# Patient Record
Sex: FEMALE | Race: OTHER | Employment: UNEMPLOYED | URBAN - METROPOLITAN AREA
[De-identification: names, ages, dates, MRNs, and addresses within clinical notes are randomized per-mention and may not be internally consistent; named-entity substitution may affect disease eponyms.]

---

## 2022-07-26 ENCOUNTER — APPOINTMENT (OUTPATIENT)
Dept: CT IMAGING | Age: 52
DRG: 392 | End: 2022-07-26
Attending: STUDENT IN AN ORGANIZED HEALTH CARE EDUCATION/TRAINING PROGRAM

## 2022-07-26 ENCOUNTER — APPOINTMENT (OUTPATIENT)
Dept: ULTRASOUND IMAGING | Age: 52
DRG: 392 | End: 2022-07-26
Attending: SURGERY

## 2022-07-26 ENCOUNTER — HOSPITAL ENCOUNTER (INPATIENT)
Age: 52
LOS: 4 days | Discharge: HOME | DRG: 392 | End: 2022-07-30
Attending: STUDENT IN AN ORGANIZED HEALTH CARE EDUCATION/TRAINING PROGRAM | Admitting: INTERNAL MEDICINE

## 2022-07-26 DIAGNOSIS — I21.4 NSTEMI (NON-ST ELEVATED MYOCARDIAL INFARCTION) (HCC): ICD-10-CM

## 2022-07-26 DIAGNOSIS — K52.9 ACUTE COLITIS: ICD-10-CM

## 2022-07-26 DIAGNOSIS — K81.0 ACUTE CHOLECYSTITIS: Primary | ICD-10-CM

## 2022-07-26 PROBLEM — E83.52 HYPERCALCEMIA: Status: ACTIVE | Noted: 2022-07-26

## 2022-07-26 PROBLEM — R82.81 PYURIA: Status: ACTIVE | Noted: 2022-07-26

## 2022-07-26 PROBLEM — K81.9 CHOLECYSTITIS: Status: ACTIVE | Noted: 2022-07-26

## 2022-07-26 PROBLEM — K80.20 CHOLELITHIASIS: Status: ACTIVE | Noted: 2022-07-26

## 2022-07-26 PROBLEM — E80.6 HYPERBILIRUBINEMIA: Status: ACTIVE | Noted: 2022-07-26

## 2022-07-26 PROBLEM — R10.9 ABDOMINAL PAIN: Status: ACTIVE | Noted: 2022-07-26

## 2022-07-26 PROBLEM — E88.89 STEATOSIS (HCC): Status: ACTIVE | Noted: 2022-07-26

## 2022-07-26 PROBLEM — R77.8 ELEVATED TROPONIN: Status: ACTIVE | Noted: 2022-07-26

## 2022-07-26 PROBLEM — R11.2 NAUSEA WITH VOMITING: Status: ACTIVE | Noted: 2022-07-26

## 2022-07-26 PROBLEM — R79.89 LFT ELEVATION: Status: ACTIVE | Noted: 2022-07-26

## 2022-07-26 LAB
ALBUMIN SERPL-MCNC: 3.9 G/DL (ref 3.5–5)
ALBUMIN/GLOB SERPL: 1 {RATIO} (ref 1.1–2.2)
ALP SERPL-CCNC: 113 U/L (ref 45–117)
ALT SERPL-CCNC: 104 U/L (ref 12–78)
ANION GAP SERPL CALC-SCNC: 5 MMOL/L (ref 5–15)
APPEARANCE UR: ABNORMAL
AST SERPL-CCNC: 49 U/L (ref 15–37)
ATRIAL RATE: 63 BPM
BACTERIA URNS QL MICRO: ABNORMAL /HPF
BASOPHILS # BLD: 0 K/UL (ref 0–0.1)
BASOPHILS NFR BLD: 0 % (ref 0–1)
BILIRUB DIRECT SERPL-MCNC: 0.4 MG/DL (ref 0–0.2)
BILIRUB SERPL-MCNC: 2.5 MG/DL (ref 0.2–1)
BILIRUB UR QL CFM: NEGATIVE
BUN SERPL-MCNC: 9 MG/DL (ref 6–20)
BUN/CREAT SERPL: 9 (ref 12–20)
CALCIUM SERPL-MCNC: 11 MG/DL (ref 8.5–10.1)
CALCIUM SERPL-MCNC: 9.9 MG/DL (ref 8.5–10.1)
CALCULATED P AXIS, ECG09: 24 DEGREES
CALCULATED R AXIS, ECG10: 63 DEGREES
CALCULATED T AXIS, ECG11: -7 DEGREES
CHLORIDE SERPL-SCNC: 111 MMOL/L (ref 97–108)
CHOLEST SERPL-MCNC: 116 MG/DL
CO2 SERPL-SCNC: 27 MMOL/L (ref 21–32)
COLOR UR: ABNORMAL
COMMENT, HOLDF: NORMAL
COVID-19 RAPID TEST, COVR: NOT DETECTED
CREAT SERPL-MCNC: 0.95 MG/DL (ref 0.55–1.02)
DIAGNOSIS, 93000: NORMAL
DIFFERENTIAL METHOD BLD: ABNORMAL
EOSINOPHIL # BLD: 0 K/UL (ref 0–0.4)
EOSINOPHIL NFR BLD: 0 % (ref 0–7)
EPITH CASTS URNS QL MICRO: ABNORMAL /LPF
ERYTHROCYTE [DISTWIDTH] IN BLOOD BY AUTOMATED COUNT: 13.5 % (ref 11.5–14.5)
ETHANOL SERPL-MCNC: <10 MG/DL
GLOBULIN SER CALC-MCNC: 3.8 G/DL (ref 2–4)
GLUCOSE SERPL-MCNC: 137 MG/DL (ref 65–100)
GLUCOSE UR STRIP.AUTO-MCNC: NEGATIVE MG/DL
HCT VFR BLD AUTO: 46.2 % (ref 35–47)
HDLC SERPL-MCNC: 33 MG/DL
HDLC SERPL: 3.5 {RATIO} (ref 0–5)
HGB BLD-MCNC: 15.8 G/DL (ref 11.5–16)
HGB UR QL STRIP: ABNORMAL
IMM GRANULOCYTES # BLD AUTO: 0 K/UL (ref 0–0.04)
IMM GRANULOCYTES NFR BLD AUTO: 0 % (ref 0–0.5)
KETONES UR QL STRIP.AUTO: 80 MG/DL
LDLC SERPL CALC-MCNC: 56.6 MG/DL (ref 0–100)
LEUKOCYTE ESTERASE UR QL STRIP.AUTO: NEGATIVE
LIPASE SERPL-CCNC: 38 U/L (ref 73–393)
LYMPHOCYTES # BLD: 1.4 K/UL (ref 0.8–3.5)
LYMPHOCYTES NFR BLD: 15 % (ref 12–49)
MCH RBC QN AUTO: 30.4 PG (ref 26–34)
MCHC RBC AUTO-ENTMCNC: 34.2 G/DL (ref 30–36.5)
MCV RBC AUTO: 88.8 FL (ref 80–99)
MONOCYTES # BLD: 0.7 K/UL (ref 0–1)
MONOCYTES NFR BLD: 8 % (ref 5–13)
NEUTS SEG # BLD: 7 K/UL (ref 1.8–8)
NEUTS SEG NFR BLD: 77 % (ref 32–75)
NITRITE UR QL STRIP.AUTO: NEGATIVE
NRBC # BLD: 0 K/UL (ref 0–0.01)
NRBC BLD-RTO: 0 PER 100 WBC
P-R INTERVAL, ECG05: 186 MS
PH UR STRIP: 5.5 [PH] (ref 5–8)
PHOSPHATE SERPL-MCNC: 1.9 MG/DL (ref 2.6–4.7)
PLATELET # BLD AUTO: 129 K/UL (ref 150–400)
PMV BLD AUTO: 10.2 FL (ref 8.9–12.9)
POTASSIUM SERPL-SCNC: 4.2 MMOL/L (ref 3.5–5.1)
PROCALCITONIN SERPL-MCNC: <0.05 NG/ML
PROT SERPL-MCNC: 7.7 G/DL (ref 6.4–8.2)
PROT UR STRIP-MCNC: 300 MG/DL
PTH-INTACT SERPL-MCNC: 115.7 PG/ML (ref 18.4–88)
Q-T INTERVAL, ECG07: 386 MS
QRS DURATION, ECG06: 88 MS
QTC CALCULATION (BEZET), ECG08: 395 MS
RBC # BLD AUTO: 5.2 M/UL (ref 3.8–5.2)
RBC #/AREA URNS HPF: ABNORMAL /HPF (ref 0–5)
SAMPLES BEING HELD,HOLD: NORMAL
SODIUM SERPL-SCNC: 143 MMOL/L (ref 136–145)
SOURCE, COVRS: NORMAL
SP GR UR REFRACTOMETRY: 1.01 (ref 1–1.03)
TRIGL SERPL-MCNC: 132 MG/DL (ref ?–150)
TROPONIN-HIGH SENSITIVITY: 113 NG/L (ref 0–51)
TROPONIN-HIGH SENSITIVITY: 149 NG/L (ref 0–51)
TSH SERPL DL<=0.05 MIU/L-ACNC: 1.88 UIU/ML (ref 0.36–3.74)
UR CULT HOLD, URHOLD: NORMAL
UROBILINOGEN UR QL STRIP.AUTO: 1 EU/DL (ref 0.2–1)
VENTRICULAR RATE, ECG03: 63 BPM
VLDLC SERPL CALC-MCNC: 26.4 MG/DL
WBC # BLD AUTO: 9.1 K/UL (ref 3.6–11)
WBC URNS QL MICRO: ABNORMAL /HPF (ref 0–4)

## 2022-07-26 PROCEDURE — 81001 URINALYSIS AUTO W/SCOPE: CPT

## 2022-07-26 PROCEDURE — 80074 ACUTE HEPATITIS PANEL: CPT

## 2022-07-26 PROCEDURE — 74011000636 HC RX REV CODE- 636: Performed by: STUDENT IN AN ORGANIZED HEALTH CARE EDUCATION/TRAINING PROGRAM

## 2022-07-26 PROCEDURE — 74011000258 HC RX REV CODE- 258: Performed by: INTERNAL MEDICINE

## 2022-07-26 PROCEDURE — 82077 ASSAY SPEC XCP UR&BREATH IA: CPT

## 2022-07-26 PROCEDURE — 84484 ASSAY OF TROPONIN QUANT: CPT

## 2022-07-26 PROCEDURE — 84100 ASSAY OF PHOSPHORUS: CPT

## 2022-07-26 PROCEDURE — 74177 CT ABD & PELVIS W/CONTRAST: CPT

## 2022-07-26 PROCEDURE — 87635 SARS-COV-2 COVID-19 AMP PRB: CPT

## 2022-07-26 PROCEDURE — 83690 ASSAY OF LIPASE: CPT

## 2022-07-26 PROCEDURE — 80061 LIPID PANEL: CPT

## 2022-07-26 PROCEDURE — 84145 PROCALCITONIN (PCT): CPT

## 2022-07-26 PROCEDURE — 74011250637 HC RX REV CODE- 250/637: Performed by: STUDENT IN AN ORGANIZED HEALTH CARE EDUCATION/TRAINING PROGRAM

## 2022-07-26 PROCEDURE — 99253 IP/OBS CNSLTJ NEW/EST LOW 45: CPT | Performed by: SURGERY

## 2022-07-26 PROCEDURE — 74011000250 HC RX REV CODE- 250: Performed by: STUDENT IN AN ORGANIZED HEALTH CARE EDUCATION/TRAINING PROGRAM

## 2022-07-26 PROCEDURE — 74011250636 HC RX REV CODE- 250/636: Performed by: INTERNAL MEDICINE

## 2022-07-26 PROCEDURE — 74011250636 HC RX REV CODE- 250/636: Performed by: SURGERY

## 2022-07-26 PROCEDURE — 99285 EMERGENCY DEPT VISIT HI MDM: CPT

## 2022-07-26 PROCEDURE — 96365 THER/PROPH/DIAG IV INF INIT: CPT

## 2022-07-26 PROCEDURE — 74011250636 HC RX REV CODE- 250/636: Performed by: STUDENT IN AN ORGANIZED HEALTH CARE EDUCATION/TRAINING PROGRAM

## 2022-07-26 PROCEDURE — 36415 COLL VENOUS BLD VENIPUNCTURE: CPT

## 2022-07-26 PROCEDURE — 74011250637 HC RX REV CODE- 250/637: Performed by: INTERNAL MEDICINE

## 2022-07-26 PROCEDURE — 80053 COMPREHEN METABOLIC PANEL: CPT

## 2022-07-26 PROCEDURE — 82248 BILIRUBIN DIRECT: CPT

## 2022-07-26 PROCEDURE — 83970 ASSAY OF PARATHORMONE: CPT

## 2022-07-26 PROCEDURE — 96375 TX/PRO/DX INJ NEW DRUG ADDON: CPT

## 2022-07-26 PROCEDURE — 87086 URINE CULTURE/COLONY COUNT: CPT

## 2022-07-26 PROCEDURE — 83036 HEMOGLOBIN GLYCOSYLATED A1C: CPT

## 2022-07-26 PROCEDURE — 85025 COMPLETE CBC W/AUTO DIFF WBC: CPT

## 2022-07-26 PROCEDURE — 84443 ASSAY THYROID STIM HORMONE: CPT

## 2022-07-26 PROCEDURE — 93005 ELECTROCARDIOGRAM TRACING: CPT

## 2022-07-26 PROCEDURE — 76705 ECHO EXAM OF ABDOMEN: CPT

## 2022-07-26 PROCEDURE — 65270000029 HC RM PRIVATE

## 2022-07-26 RX ORDER — HYDROMORPHONE HYDROCHLORIDE 1 MG/ML
0.5 INJECTION, SOLUTION INTRAMUSCULAR; INTRAVENOUS; SUBCUTANEOUS
Status: COMPLETED | OUTPATIENT
Start: 2022-07-26 | End: 2022-07-26

## 2022-07-26 RX ORDER — ACETAMINOPHEN 650 MG/1
650 SUPPOSITORY RECTAL
Status: DISCONTINUED | OUTPATIENT
Start: 2022-07-26 | End: 2022-07-28

## 2022-07-26 RX ORDER — ONDANSETRON 2 MG/ML
4 INJECTION INTRAMUSCULAR; INTRAVENOUS
Status: DISCONTINUED | OUTPATIENT
Start: 2022-07-26 | End: 2022-07-30 | Stop reason: HOSPADM

## 2022-07-26 RX ORDER — ONDANSETRON 4 MG/1
4 TABLET, ORALLY DISINTEGRATING ORAL
Status: DISCONTINUED | OUTPATIENT
Start: 2022-07-26 | End: 2022-07-28

## 2022-07-26 RX ORDER — KETOROLAC TROMETHAMINE 30 MG/ML
30 INJECTION, SOLUTION INTRAMUSCULAR; INTRAVENOUS
Status: COMPLETED | OUTPATIENT
Start: 2022-07-26 | End: 2022-07-26

## 2022-07-26 RX ORDER — DEXTROSE, SODIUM CHLORIDE, AND POTASSIUM CHLORIDE 5; .45; .15 G/100ML; G/100ML; G/100ML
100 INJECTION INTRAVENOUS CONTINUOUS
Status: DISCONTINUED | OUTPATIENT
Start: 2022-07-26 | End: 2022-07-30 | Stop reason: HOSPADM

## 2022-07-26 RX ORDER — ACETAMINOPHEN 325 MG/1
650 TABLET ORAL
Status: DISCONTINUED | OUTPATIENT
Start: 2022-07-26 | End: 2022-07-30 | Stop reason: HOSPADM

## 2022-07-26 RX ORDER — METRONIDAZOLE 500 MG/100ML
500 INJECTION, SOLUTION INTRAVENOUS
Status: DISCONTINUED | OUTPATIENT
Start: 2022-07-26 | End: 2022-07-26

## 2022-07-26 RX ORDER — HYDROMORPHONE HYDROCHLORIDE 1 MG/ML
1 INJECTION, SOLUTION INTRAMUSCULAR; INTRAVENOUS; SUBCUTANEOUS
Status: DISCONTINUED | OUTPATIENT
Start: 2022-07-26 | End: 2022-07-30 | Stop reason: HOSPADM

## 2022-07-26 RX ORDER — POLYETHYLENE GLYCOL 3350 17 G/17G
17 POWDER, FOR SOLUTION ORAL DAILY PRN
Status: DISCONTINUED | OUTPATIENT
Start: 2022-07-26 | End: 2022-07-28

## 2022-07-26 RX ORDER — GUAIFENESIN 100 MG/5ML
162 LIQUID (ML) ORAL
Status: COMPLETED | OUTPATIENT
Start: 2022-07-26 | End: 2022-07-26

## 2022-07-26 RX ORDER — FAMOTIDINE 20 MG/1
20 TABLET, FILM COATED ORAL
Status: COMPLETED | OUTPATIENT
Start: 2022-07-26 | End: 2022-07-26

## 2022-07-26 RX ORDER — ENOXAPARIN SODIUM 100 MG/ML
40 INJECTION SUBCUTANEOUS DAILY
Status: DISCONTINUED | OUTPATIENT
Start: 2022-07-27 | End: 2022-07-27

## 2022-07-26 RX ORDER — SODIUM CHLORIDE 9 MG/ML
100 INJECTION, SOLUTION INTRAVENOUS CONTINUOUS
Status: DISCONTINUED | OUTPATIENT
Start: 2022-07-26 | End: 2022-07-26

## 2022-07-26 RX ORDER — ONDANSETRON 2 MG/ML
4 INJECTION INTRAMUSCULAR; INTRAVENOUS
Status: COMPLETED | OUTPATIENT
Start: 2022-07-26 | End: 2022-07-26

## 2022-07-26 RX ADMIN — PIPERACILLIN AND TAZOBACTAM 4.5 G: 4; .5 INJECTION, POWDER, FOR SOLUTION INTRAVENOUS at 12:10

## 2022-07-26 RX ADMIN — SODIUM CHLORIDE 1000 ML: 9 INJECTION, SOLUTION INTRAVENOUS at 07:42

## 2022-07-26 RX ADMIN — FAMOTIDINE 20 MG: 20 TABLET, FILM COATED ORAL at 07:41

## 2022-07-26 RX ADMIN — POTASSIUM CHLORIDE, DEXTROSE MONOHYDRATE AND SODIUM CHLORIDE 100 ML/HR: 150; 5; 450 INJECTION, SOLUTION INTRAVENOUS at 13:44

## 2022-07-26 RX ADMIN — HYDROMORPHONE HYDROCHLORIDE 1 MG: 1 INJECTION, SOLUTION INTRAMUSCULAR; INTRAVENOUS; SUBCUTANEOUS at 16:16

## 2022-07-26 RX ADMIN — METRONIDAZOLE 500 MG: 500 INJECTION, SOLUTION INTRAVENOUS at 11:32

## 2022-07-26 RX ADMIN — HYDROMORPHONE HYDROCHLORIDE 1 MG: 1 INJECTION, SOLUTION INTRAMUSCULAR; INTRAVENOUS; SUBCUTANEOUS at 22:13

## 2022-07-26 RX ADMIN — ONDANSETRON 4 MG: 2 INJECTION INTRAMUSCULAR; INTRAVENOUS at 07:41

## 2022-07-26 RX ADMIN — PIPERACILLIN AND TAZOBACTAM 3.38 G: 3; .375 INJECTION, POWDER, FOR SOLUTION INTRAVENOUS at 18:36

## 2022-07-26 RX ADMIN — ONDANSETRON 4 MG: 4 TABLET, ORALLY DISINTEGRATING ORAL at 12:13

## 2022-07-26 RX ADMIN — ASPIRIN 162 MG: 81 TABLET, CHEWABLE ORAL at 11:50

## 2022-07-26 RX ADMIN — HYDROMORPHONE HYDROCHLORIDE 0.5 MG: 1 INJECTION, SOLUTION INTRAMUSCULAR; INTRAVENOUS; SUBCUTANEOUS at 11:40

## 2022-07-26 RX ADMIN — SODIUM CHLORIDE, PRESERVATIVE FREE 2 G: 5 INJECTION INTRAVENOUS at 11:34

## 2022-07-26 RX ADMIN — IOPAMIDOL 100 ML: 755 INJECTION, SOLUTION INTRAVENOUS at 09:33

## 2022-07-26 RX ADMIN — KETOROLAC TROMETHAMINE 30 MG: 30 INJECTION, SOLUTION INTRAMUSCULAR; INTRAVENOUS at 07:41

## 2022-07-26 NOTE — ED TRIAGE NOTES
Pt arrives to ED with complaints of epigastric pain, and upper back pain with headache and vomiting since last night. Pt was unable to sleep last night. Pt reports she thinks she is constipated, as this is a continuing problem for her. Pt denies health hx.

## 2022-07-26 NOTE — CONSULTS
Surgery Consult    Subjective:      Sebastien Marquez is a 47 y.o.  female who presents with generalized abdominal pain. For the past week, Mrs. Donald Kimbrough has experienced constipation. About 2 days ago, she developed generalized abdominal pain. The pain has progressively worsened and was associated with subjective fever and n/v. She denies any jaundice, diarrhea, or blood in her stool. She has no h/o PUD, pancreatitis, liver disease, or colitis. Her previous abdominal procedures include a  and lap appendectomy. On presentation to the ER, her CT revealed findings c/w colitis in the cecum and ascending colon with mild GB wall thickening and distention, but no stones are documented. Her troponin was elevated as well. No past medical history on file. Past Surgical History:   Procedure Laterality Date    HX APPENDECTOMY        No family history on file.   Social History     Socioeconomic History    Marital status:       Current Facility-Administered Medications   Medication Dose Route Frequency Provider Last Rate Last Admin    acetaminophen (TYLENOL) tablet 650 mg  650 mg Oral Q6H PRN Tom Fair MD        Or    acetaminophen (TYLENOL) suppository 650 mg  650 mg Rectal Q6H PRN Tom Fair MD        polyethylene glycol (MIRALAX) packet 17 g  17 g Oral DAILY PRN Tom Fair MD        ondansetron (ZOFRAN ODT) tablet 4 mg  4 mg Oral Q8H PRN Tom Fair MD   4 mg at 22 1213    Or    ondansetron (ZOFRAN) injection 4 mg  4 mg IntraVENous Q6H PRN Tom Fair MD        [START ON 2022] enoxaparin (LOVENOX) injection 40 mg  40 mg SubCUTAneous DAILY Tom Fair MD        dextrose 5% - 0.45% NaCl with KCl 20 mEq/L infusion  100 mL/hr IntraVENous CONTINUOUS Tom Fair  mL/hr at 22 1344 100 mL/hr at 22 1344    piperacillin-tazobactam (ZOSYN) 4.5 g in 0.9% sodium chloride (MBP/ADV) 100 mL MBP  4.5 g IntraVENous ONCE [facility] MD LIS 25 mL/hr at 22 1210 4.5 g at 22 1210    Followed by    piperacillin-tazobactam (ZOSYN) 3.375 g in 0.9% sodium chloride (MBP/ADV) 100 mL MBP  3.375 g IntraVENous Q8H Sandi Campa MD            No Known Allergies    Review of Systems:  A comprehensive review of systems was negative except for that written in the History of Present Illness. Objective:      Patient Vitals for the past 8 hrs:   BP Temp Pulse Resp SpO2 Height Weight   22 1206 -- -- -- -- -- 5' 6\" (1.676 m) 190 lb (86.2 kg)   22 1121 138/70 98.2 °F (36.8 °C) 65 22 99 % -- --       Temp (24hrs), Av.7 °F (36.5 °C), Min:97.2 °F (36.2 °C), Max:98.2 °F (36.8 °C)      Physical Exam:  GENERAL: alert, cooperative, no distress, appears stated age, EYE: negative, LYMPHATIC: Cervical, supraclavicular nodes normal. , THROAT & NECK: normal, LUNG: clear to auscultation bilaterally, HEART: regular rate and rhythm, ABDOMEN: Soft, hypoactive BS, ND, mild diffuse tenderness without guarding.   There is a healed lower midline surgical scar., EXTREMITIES:  no edema, SKIN: Normal., NEUROLOGIC: negative, PSYCHIATRIC: non focal    Assessment:     Hospital Problems  Date Reviewed: 2022            Codes Class Noted POA    LFT elevation ICD-10-CM: R79.89  ICD-9-CM: 790.6  2022 Yes        Elevated troponin ICD-10-CM: R77.8  ICD-9-CM: 790.6  2022 Yes        Colitis ICD-10-CM: K52.9  ICD-9-CM: 558.9  2022 Yes        Abdominal pain ICD-10-CM: R10.9  ICD-9-CM: 789.00  2022 Yes        Steatosis ([address] ICD-10-CM: E88.89  ICD-9-CM: 272.8  2022 Yes        Hyperbilirubinemia ICD-10-CM: E80.6  ICD-9-CM: 782.4  2022 Yes        Hypercalcemia ICD-10-CM: E83.52  ICD-9-CM: 275.42  2022 Yes        Pyuria ICD-10-CM: R82.81  ICD-9-CM: 791.9  2022 Yes        Cholelithiasis ICD-10-CM: K80.20  ICD-9-CM: 574.20  2022 Yes        Nausea with vomiting ICD-10-CM: R11.2  ICD-9-CM: 787.01  2022 Yes Cholecystitis ICD-10-CM: K81.9  ICD-9-CM: 575.10  7/26/2022 Yes           Plan:     Mrs. Lizette Blackwell pain is diffuse which is most likely secondary to her colitis. The findings regarding her GB may be related to the adjacent inflammation. I will order an US to better evaluate the GB and to document the presence of stones. Ideally, the best time to perform a cholecystectomy would be on an elective basis in the absence of colitis. I would continue bowel rest, aggressive hydration, and add antibiotics as needed. GI has been asked to see her as well. I appreciate the medical service allowing me to participate in Mrs. Gil's care. Any further recommendations will be made as needed.

## 2022-07-26 NOTE — PROGRESS NOTES
CARE MANAGEMENT INITIAL ASSESSEMENT      NAME:   Jerica Boothe   :     1970   MRN:     [id number]       Emergency Contact:  Extended Emergency Contact Information  Primary Emergency Contact: Lidia Diaz  Mobile Phone: [phone]  Relation: Sister  Preferred language: Uzbek   needed? No    Advance Directive:  Full Code, does not have an advance directive. Kit Billy Healthcare Decision Maker:   Mickey Fraire- sister- [phone]    Reason for Admission:  Ms. Neri Kraus is a 47 y.o. female with history that includes no significant medical issues  who was emergently admitted for:  colitis    Patient Active Problem List   Diagnosis Code    LFT elevation R79.89    Elevated troponin R77.8    Colitis K52.9    Abdominal pain R10.9    Steatosis (HCC) E88.89    Hyperbilirubinemia E80.6    Hypercalcemia E83.52    Pyuria R82.81    Cholelithiasis K80.20    Nausea with vomiting R11.2    Cholecystitis K81.9       Assessment: In person with patient and family member (sister) . RUR:  4%  Risk Level:  Low  Value-based purchasing:   No  Bundle patient:  No    Residency:  Private residence  Exterior Steps:  None  Interior Steps:  None    Lives With:  Other family member(s) (brothers and sisters)    Prior functioning:  Independent. Patient requires assistance with:  N/A    Prior DME required:  None    DME available:  None    Rehab history:  None    Discharge Concerns/Barriers Identified:  No PCP and Uninsured or underinsured      Insurer:  Payor: /     PCP: None   Name of Practice:  None   Current patient: N/A   Approximate date of last visit: N/A   Access to virtual PCP visits:  N/A    Pharmacy:  [address] [place] [address]   Financial/Difficulty affording medications:  None identified    COVID-19 vaccination status:  Fully vaccinated + booster    DC Transport:  Family      Transition of care plan:  Home with outpatient follow-up     Comments:   Pt admitted on 22 for colitis.  CM met with Pt and sister to complete initial assessment. Pt lives at home with family (brothers and sisters). Pt has no hx of HH, home O2, or equipment. Pt is independent with ADLs and ambulation. Pt denies problems with either. Pt is not employed. Pt is able to drive. Pt does not have a PCP. CM provided Pt and sister with [address] information. Pt does not have insurance. CM provided Pt and sister with financial assistance application [place] Discharge plan is for Pt to return home. Family will transport Pt home.   _____________________________________  Lesley Ramsey, [address] [facility]  Available via [employer]  7/26/2022   4:18 PM      Care Management Interventions  PCP Verified by CM: Yes (NO PCP)  Mode of Transport at Discharge:  Other (see comment) (family)  Transition of Care Consult (CM Consult): Discharge Planning  MyChart Signup: No  Discharge Durable Medical Equipment: No  Physical Therapy Consult: No  Occupational Therapy Consult: No  Speech Therapy Consult: No  Support Systems: Other Family Member(s)  Confirm Follow Up Transport: Family  Discharge Location  Patient Expects to be Discharged to[de-identified] Home

## 2022-07-26 NOTE — ED PROVIDER NOTES
Chief Complaint   Patient presents with    Abdominal Pain     History and review of systems obtained via [email or web address] . This is a 48-year-old female presenting with epigastric abdominal pain that started yesterday she also feels very constipated and has been taking Dulcolax without any relief. Had an episode of vomiting last night and she continues to feel nauseous, endorses a headache. Last bowel movement was yesterday. No rectal bleeding. She denies any history of abdominal surgery. The pain radiates towards her back and up towards her chest causing her to feel short of breath. Subjective fevers at home, afebrile here. Denies any dysuria or urinary frequency. No other systemic complaints. Symptoms are moderate in nature without alleviating factors. Review of Systems   Constitutional:  Negative for fever. HENT:  Negative for facial swelling. Eyes:  Negative for redness. Respiratory:  Positive for shortness of breath. Cardiovascular:  Positive for chest pain. Gastrointestinal:  Positive for abdominal pain, constipation and vomiting. Genitourinary:  Negative for difficulty urinating, dysuria and frequency. Musculoskeletal:  Positive for back pain. Neurological:  Positive for headaches. Psychiatric/Behavioral:  Negative for confusion. No past medical history on file. CORRECTION:  Obtained by me, includes constipation    No past surgical history on file. CORRECTION:  Obtained by me, no prior abdominal surgery      No family history on file.  CORRECTION:  Obtained by me, social history negative for alcohol use, she is a former smoker (cigarettes)    Social History     Socioeconomic History    Marital status:      Spouse name: Not on file    Number of children: Not on file    Years of education: Not on file    Highest education level: Not on file   Occupational History    Not on file   Tobacco Use    Smoking status: Not on file    Smokeless tobacco: Not on file Substance and Sexual Activity    Alcohol use: Not on file    Drug use: Not on file    Sexual activity: Not on file   Other Topics Concern    Not on file   Social History Narrative    Not on file     Social Determinants of Health     Financial Resource Strain: Not on file   Food Insecurity: Not on file   Transportation Needs: Not on file   Physical Activity: Not on file   Stress: Not on file   Social Connections: Not on file   Intimate Partner Violence: Not on file   Housing Stability: Not on file         ALLERGIES: Patient has no known allergies. Vitals:    07/26/22 0706 07/26/22 1121   BP: (!) 144/74 138/70   Pulse: 70 65   Resp: 18 22   Temp: 97.2 °F (36.2 °C) 98.2 °F (36.8 °C)   SpO2: 98% 99%       Physical exam  General:  Awake and alert, NAD  HEENT:  NC/AT, equal pupils, moist mucous membranes  Neck:   Normal inspection, full range of motion  Cardiac:  RRR, no murmurs  Respiratory:  Clear bilaterally, no wheezes, rales, rhonchi  Abdomen:  Soft and nondistended, +tender in the epigastrium and suprapubic regions, negative Choudhury's sign  Back:   Good ROM  Extremities: Warm and well perfused, no peripheral edema  Neuro:  Moving all extremities symmetrically without gross motor deficit, normal gait  Skin:   No rashes, ecchymoses, or pallor    Recent Results (from the past 12 hour(s))   SAMPLES BEING HELD    Collection Time: 07/26/22  7:36 AM   Result Value Ref Range    SAMPLES BEING HELD RED. ESTRELLA     COMMENT        Add-on orders for these samples will be processed based on acceptable specimen integrity and analyte stability, which may vary by analyte.    CBC WITH AUTOMATED DIFF    Collection Time: 07/26/22  7:36 AM   Result Value Ref Range    WBC 9.1 3.6 - 11.0 K/uL    RBC 5.20 3.80 - 5.20 M/uL    HGB 15.8 11.5 - 16.0 g/dL    HCT 46.2 35.0 - 47.0 %    MCV 88.8 80.0 - 99.0 FL    MCH 30.4 26.0 - 34.0 PG    MCHC 34.2 30.0 - 36.5 g/dL    RDW 13.5 11.5 - 14.5 %    PLATELET 333 (L) 466 - 400 K/uL    MPV 10.2 8.9 - 12.9 FL    NRBC 0.0 0  WBC    ABSOLUTE NRBC 0.00 0.00 - 0.01 K/uL    NEUTROPHILS 77 (H) 32 - 75 %    LYMPHOCYTES 15 12 - 49 %    MONOCYTES 8 5 - 13 %    EOSINOPHILS 0 0 - 7 %    BASOPHILS 0 0 - 1 %    IMMATURE GRANULOCYTES 0 0.0 - 0.5 %    ABS. NEUTROPHILS 7.0 1.8 - 8.0 K/UL    ABS. LYMPHOCYTES 1.4 0.8 - 3.5 K/UL    ABS. MONOCYTES 0.7 0.0 - 1.0 K/UL    ABS. EOSINOPHILS 0.0 0.0 - 0.4 K/UL    ABS. BASOPHILS 0.0 0.0 - 0.1 K/UL    ABS. IMM. GRANS. 0.0 0.00 - 0.04 K/UL    DF AUTOMATED     METABOLIC PANEL, COMPREHENSIVE    Collection Time: 07/26/22  7:36 AM   Result Value Ref Range    Sodium 143 136 - 145 mmol/L    Potassium 4.2 3.5 - 5.1 mmol/L    Chloride 111 (H) 97 - 108 mmol/L    CO2 27 21 - 32 mmol/L    Anion gap 5 5 - 15 mmol/L    Glucose 137 (H) 65 - 100 mg/dL    BUN 9 6 - 20 MG/DL    Creatinine 0.95 0.55 - 1.02 MG/DL    BUN/Creatinine ratio 9 (L) 12 - 20      GFR est AA >60 >60 ml/min/1.73m2    GFR est non-AA >60 >60 ml/min/1.73m2    Calcium 11.0 (H) 8.5 - 10.1 MG/DL    Bilirubin, total 2.5 (H) 0.2 - 1.0 MG/DL    ALT (SGPT) 104 (H) 12 - 78 U/L    AST (SGOT) 49 (H) 15 - 37 U/L    Alk.  phosphatase 113 45 - 117 U/L    Protein, total 7.7 6.4 - 8.2 g/dL    Albumin 3.9 3.5 - 5.0 g/dL    Globulin 3.8 2.0 - 4.0 g/dL    A-G Ratio 1.0 (L) 1.1 - 2.2     LIPASE    Collection Time: 07/26/22  7:36 AM   Result Value Ref Range    Lipase 38 (L) 73 - 393 U/L   TROPONIN-HIGH SENSITIVITY    Collection Time: 07/26/22  7:36 AM   Result Value Ref Range    Troponin-High Sensitivity 113 (H) 0 - 51 ng/L   URINALYSIS W/MICROSCOPIC    Collection Time: 07/26/22  7:36 AM   Result Value Ref Range    Color BROWN      Appearance CLOUDY (A) CLEAR      Specific gravity 1.014 1.003 - 1.030      pH (UA) 5.5 5.0 - 8.0      Protein 300 (A) NEG mg/dL    Glucose Negative NEG mg/dL    Ketone 80 (A) NEG mg/dL    Blood MODERATE (A) NEG      Urobilinogen 1.0 0.2 - 1.0 EU/dL    Nitrites Negative NEG      Leukocyte Esterase Negative NEG      WBC 5-10 0 - 4 /hpf    RBC 5-10 0 - 5 /hpf    Epithelial cells MANY (A) FEW /lpf    Bacteria 3+ (A) NEG /hpf   URINE CULTURE HOLD SAMPLE    Collection Time: 07/26/22  7:36 AM    Specimen: Serum; Urine   Result Value Ref Range    Urine culture hold        Urine on hold in Microbiology dept for 2 days. If unpreserved urine is submitted, it cannot be used for addtional testing after 24 hours, recollection will be required. BILIRUBIN, CONFIRM    Collection Time: 07/26/22  7:36 AM   Result Value Ref Range    Bilirubin UA, confirm Negative NEG     TROPONIN-HIGH SENSITIVITY    Collection Time: 07/26/22 10:20 AM   Result Value Ref Range    Troponin-High Sensitivity 149 (HH) 0 - 51 ng/L      CT ABD PELV W CONT    Result Date: 7/26/2022  1. Findings consistent with an acute infectious or inflammatory colitis involving cecum and ascending colon. No evidence of abscess or free intraperitoneal air. 2. Distended gallbladder with suspected mild gallbladder wall thickening, which may represent acute cholecystitis. Correlate clinically. 3. Hepatic steatosis. 4. Bilateral nonobstructing nephrolithiasis. 5. Enlarged leiomyomatous uterus. 6. A 5 mm pulmonary nodule in the right lower lobe. Optional follow-up chest CT in 12 months may be considered per Fleischner criteria. 7. Additional findings as above. Procedures - none unless documented below    EKG as interpreted by me:  Normal sinus rhythm at a rate of 63, normal axis and intervals, grossly no ST or T-wave changes suggesting acute ischemia. ED course: Labs, EKG and imaging reviewed. Presented with epigastric and lower abdominal pain, CT abdomen indicates findings consistent with both acute colitis and acute cholecystitis. No signs of gallstone pancreatitis. Also has an elevated troponin, trending upwards in the setting of a non ischemic 12-lead. She has no history of structural heart disease but also does not see a primary physician.   Aspirin ordered, I discussed her case with general surgery Jairo Angulo) who did not want to take her to the OR without having cardiology weigh in, I've started Rocephin and Flagyl and placed a call to the cardiologist.  Will admit for continued management, discussed with the hospitalist.    Mary Murray Serve for Admission  11:36 AM    ED Room Number:   ER20/20  Patient Name and age:  Bibiana Medina 47 y.o.  female  Working Diagnosis:     1. Acute cholecystitis    2. Acute colitis    3. Elevated troponin      COVID suspicion:   no  Code Status:    Full Code  Readmission:    no  Isolation Requirements:  no  Recommended Level of Care: telemetry  [facility]    [facility] - [phone]  Other:     Presented with epigastric and lower abdominal pain, CT abdomen indicates findings consistent with both acute colitis and acute cholecystitis. No signs of gallstone pancreatitis. Also has an elevated troponin, trending upwards in the setting of a non ischemic 12-lead. She has no history of structural heart disease but also does not see a primary physician.   Aspirin ordered, I discussed her case with general surgery Jairo Angulo) who did not want to take her to the OR without having cardiology weigh in, I've started Rocephin and Flagyl and placed a call to the cardiologist.

## 2022-07-26 NOTE — CONSULTS
[address] [facility]. Mateo Fernandez M.D.  [phone]                    GASTROENTEROLOGY CONSULTATION NOTE              NAME:  Mateo Ortiz   :   1970   MRN:   [id number]       Referring Physician:    Dr. Bud Christy Date:   2022 2:06 PM    Chief Complaint:    Abdominal pain     History of Present Illness:    Patient is a 47 y.o. with no known PMH who presented to the ED today with abdominal pain that started about 1 week ago. Per Pt the pain started out very mild then gradually got worse that made her come into the ED today. The pain is in the mid abdomen that radiates to the back with associated n/v and constipation. She had 1 episode of vomiting 2 days ago after she drank a lemon drink. Denies hematemesis. Last BM was 2 days ago. Denies NSAID use, alcohol, or past colonoscopy. States she had a fever and chills yesterday. Her mother has a history of cirrhosis. ED workup included a CT showing \"Findings consistent with an acute infectious or inflammatory colitis involving cecum and ascending colon. No evidence of abscess or free intraperitoneal air. Distended gallbladder with suspected mild gallbladder wall thickening, which may represent acute cholecystitis. Correlate clinically. Hepatic steatosis. \"      PMH:  No past medical history on file.     PSH:  Past Surgical History:   Procedure Laterality Date    HX APPENDECTOMY         Allergies:  No Known Allergies    Home Medications:  None       Hospital Medications:  Current Facility-Administered Medications   Medication Dose Route Frequency    acetaminophen (TYLENOL) tablet 650 mg  650 mg Oral Q6H PRN    Or    acetaminophen (TYLENOL) suppository 650 mg  650 mg Rectal Q6H PRN    polyethylene glycol (MIRALAX) packet 17 g  17 g Oral DAILY PRN    ondansetron (ZOFRAN ODT) tablet 4 mg  4 mg Oral Q8H PRN    Or    ondansetron (ZOFRAN) injection 4 mg  4 mg IntraVENous Q6H PRN    [START ON 2022] enoxaparin (LOVENOX) injection 40 mg  40 mg SubCUTAneous DAILY    dextrose 5% - 0.45% NaCl with KCl 20 mEq/L infusion  100 mL/hr IntraVENous CONTINUOUS    piperacillin-tazobactam (ZOSYN) 4.5 g in 0.9% sodium chloride (MBP/ADV) 100 mL MBP  4.5 g IntraVENous ONCE    Followed by    piperacillin-tazobactam (ZOSYN) 3.375 g in 0.9% sodium chloride (MBP/ADV) 100 mL MBP  3.375 g IntraVENous Q8H     No current outpatient medications on file. Social History:  Social History     Tobacco Use    Smoking status: Not on file    Smokeless tobacco: Not on file   Substance Use Topics    Alcohol use: Not on file       Family History:  No family history on file. Review of Systems:  Constitutional: negative fever, negative chills, negative weight loss  Eyes:   negative visual changes  ENT:   negative sore throat, tongue or lip swelling  Respiratory:  negative cough, negative dyspnea  Cards:  negative for chest pain, palpitations, lower extremity edema  GI:   See HPI  :  negative for frequency, dysuria  Integument:  negative for rash and pruritus  Heme:  negative for easy bruising and gum/nose bleeding  Musculoskel: negative for myalgias,  back pain and muscle weakness  Neuro: negative for headaches, dizziness, vertigo  Psych:  negative for feelings of anxiety, depression     Objective:   Patient Vitals for the past 8 hrs:   BP Temp Pulse Resp SpO2 Height Weight   07/26/22 1206 -- -- -- -- -- 5' 6\" (1.676 m) 86.2 kg (190 lb)   07/26/22 1121 138/70 98.2 °F (36.8 °C) 65 22 99 % -- --   07/26/22 0706 (!) 144/74 97.2 °F (36.2 °C) 70 18 98 % -- --     No intake/output data recorded. No intake/output data recorded. EXAM:     NEURO-alert, oriented x3, affect appropriate, no focal neurological deficits, moves all extremities well   HEENT-Head: Normal, normocephalic, atraumatic. Eye: Normal external eye, conjunctiva, lids cornea, MUMTAZ.    LUNGS-clear to auscultation bilaterally    COR-regular rate and rhythym     ABD- abnormal findings:   tenderness mild in the entire abdomen     EXT-no edema    Skin - No rash     Data Review     Recent Labs     07/26/22  0736   WBC 9.1   HGB 15.8   HCT 46.2   *     Recent Labs     07/26/22  1258 07/26/22  0736   NA  --  143   K  --  4.2   CL  --  111*   CO2  --  27   BUN  --  9   CREA  --  0.95   GLU  --  137*   PHOS 1.9*  --    CA  --  11.0*     Recent Labs     07/26/22  0736      TP 7.7   ALB 3.9   GLOB 3.8   LPSE 38*     No results for input(s): INR, PTP, APTT, INREXT in the last 72 hours. Patient Active Problem List   Diagnosis Code    LFT elevation R79.89    Elevated troponin R77.8    Colitis K52.9    Abdominal pain R10.9    Steatosis (HCC) E88.89    Hyperbilirubinemia E80.6    Hypercalcemia E83.52    Pyuria R82.81    Cholelithiasis K80.20    Nausea with vomiting R11.2       Assessment and Plan:  Abdominal pain with nausea and vomiting: CT showing \"Findings consistent with an acute infectious or inflammatory colitis involving cecum and ascending colon. No evidence of abscess or free intraperitoneal air. \"    - continue antibiotics and antiemetics  - NPO with IVF  - Stool tests  - Follow up as outpatient for colonoscopy. Elevated LFTs: CT showing \"Hepatic steatosis. \" Synthetic liver function is intact. Hepatitis panel is negative. Potentially due to HOROWITZ    - continue to monitor LFTs and CBC  - Follow up in the office for elastography. CT also showing \"Distended gallbladder with suspected mild gallbladder wall thickening, which may represent acute cholecystitis. Correlate clinically. \"    - Generally Surgery consulted    Pt discussed with Dr. Esdras Car. Will see again on request.  Please call with questions or concerns. Thanks for allowing me to participate in the care of this patient.   Signed By: Remy PEREZ     7/26/2022  2:06 PM

## 2022-07-26 NOTE — ED NOTES
TRANSFER - OUT REPORT:    Verbal report given to [facility] RN (name) on Chalo Ingram  being transferred to 5th floor (unit) for routine progression of care       Report consisted of patients Situation, Background, Assessment and   Recommendations(SBAR). Information from the following report(s) SBAR, ED Summary, Procedure Summary, Intake/Output, MAR, Recent Results, and Quality Measures was reviewed with the receiving nurse. Lines:   Peripheral IV 07/26/22 Right Antecubital (Active)   Site Assessment Clean, dry, & intact 07/26/22 0741   Phlebitis Assessment 0 07/26/22 0741   Infiltration Assessment 0 07/26/22 0741   Dressing Status Clean, dry, & intact 07/26/22 0741   Dressing Type Transparent;Tape 07/26/22 0741   Hub Color/Line Status Pink;Patent; Flushed 07/26/22 0741   Action Taken Blood drawn 07/26/22 0741       Peripheral IV 07/26/22 Left Antecubital (Active)   Site Assessment Clean, dry, & intact 07/26/22 1714   Phlebitis Assessment 0 07/26/22 1714   Infiltration Assessment 0 07/26/22 1714   Dressing Status Clean, dry, & intact 07/26/22 1714   Dressing Type Transparent;Tape 07/26/22 1714   Hub Color/Line Status Pink 07/26/22 1714        Opportunity for questions and clarification was provided.       Patient transported with:   [email or web address]

## 2022-07-26 NOTE — H&P
[facility]  [address], [facility] [address]  [phone]    Hospitalist Admission Note      NAME:  Josselyn Kong   :   1970   MRN:  [id number]     PCP:  None     Date/Time of service:  2022 11:57 AM          Subjective:     CHIEF COMPLAINT: abd pain, vomiting     HISTORY OF PRESENT ILLNESS:     Ms. Deena Ware is a 47 y.o.  female who presented to the Emergency Department complaining of abd pain and vomiting. Family at bedside to translate. Going on for 2 days. No fever. Some dyspnea. Non bilious non bloody vomiting. ER finds colitis on CT. Not septic. We will admit her for management. No past medical history on file. She has no known DX, but has no MD    Past Surgical History:   Procedure Laterality Date    HX APPENDECTOMY         Social History     Tobacco Use    Smoking status: Non smoker    Smokeless tobacco: None   Substance Use Topics    Alcohol use: None        No family history on file.    Family hx cannot be fully assessed, since the patient cannot provide detailed information    No Known Allergies     Prior to Admission medications    Not on File       Review of Systems:  (bold if positive, if negative)    Gen:  Eyes:  ENT:  CVS:  Pulm:  dyspneaGI:  Abdominal pain, nausea, emesis, :  MS:  Skin:  Psych:  Endo:  Hem:  Renal:  Neuro:        Objective:      VITALS:    Vital signs reviewed; most recent are:    Visit Vitals  /70 (BP 1 Location: Left upper arm, BP Patient Position: At rest;Reclining)   Pulse 65   Temp 98.2 °F (36.8 °C)   Resp 22   Ht 5' 6\" (1.676 m)   Wt 86.2 kg (190 lb)   SpO2 99%   BMI 30.67 kg/m²     SpO2 Readings from Last 6 Encounters:   22 99%        No intake or output data in the 24 hours ending 22 1249     Exam:     Physical Exam:    Gen:  Well-developed, well-nourished, in no acute distress  HEENT:  Pink conjunctivae, PERRL, hearing intact to voice, moist mucous membranes  Neck:  Supple, without masses, thyroid non-tender  Resp:  No accessory muscle use, clear breath sounds without wheezes rales or rhonchi  Card:  No murmurs, normal S1, S2 without thrills, bruits or peripheral edema  Abd:  Soft, non-tender, non-distended, normoactive bowel sounds are present, no mass  Lymph:  No cervical or inguinal adenopathy  Musc:  No cyanosis or clubbing  Skin:  No rashes or ulcers, skin turgor is good  Neuro:  Cranial nerves are grossly intact, no focal motor weakness, follows commands appropriately  Psych:  Good insight, oriented to person, place and time, alert     Labs:    Recent Labs     07/26/22 0736   WBC 9.1   HGB 15.8   HCT 46.2   *     Recent Labs     07/26/22 0736      K 4.2   *   CO2 27   *   BUN 9   CREA 0.95   CA 11.0*   ALB 3.9   TBILI 2.5*   *     No results found for: GLUCPOC  No results for input(s): PH, PCO2, PO2, HCO3, FIO2 in the last 72 hours. No results for input(s): INR, INREXT, INREXT in the last 72 hours. All Micro Results       Procedure Component Value Units Date/Time    CULTURE, URINE [[id number]]     Order Status: Sent Specimen: Cath Urine     COVID-19 RAPID TEST [[id number]]     Order Status: Sent     URINE CULTURE HOLD SAMPLE [[id number]] Collected: 07/26/22 0736    Order Status: Completed Specimen: Urine from Serum Updated: 07/26/22 0759     Urine culture hold       Urine on hold in Microbiology dept for 2 days. If unpreserved urine is submitted, it cannot be used for addtional testing after 24 hours, recollection will be required. I have reviewed previous records       Assessment and Plan:      Colitis / Abdominal pain / Nausea with vomiting - POA, unclear etiology. Infectious vs ischemic vs autiimmune. GI consult. Check labs. For now NPO, IVF and Zosyn. Steatosis / LFT elevation / Hyperbilirubinemia - noted on labs, could be acute, but I suspect underlying cirrhosis of some sort. GI consult.  Check serologies, alcohol etc.    Cholelithiasis - Noted on CT but unclear if acute. ER consulted surgery. Elevated troponin - POA, minimal elevation, due to strain in setting of vomiting and colitis. Hypercalcemia - POA, may be some dehydration. Check PTH, Phos and monitor    Pyuria - Contaminate UA. Recheck. Telemetry reviewed:   normal sinus rhythm    Risk of deterioration: high      Total time spent with patient: 48 Minutes I personally reviewed chart, notes, data and current medications in the medical record. I have personally examined and treated the patient at bedside during this period. To assist coordination of care and communication with nursing and staff, this note may be preliminary early in the day, but finalized by end of the day.                  Care Plan discussed with: Patient, Nursing Staff, Consultant/Specialist, and >50% of time spent in counseling and coordination of care    Discussed:  Care Plan and D/C Planning       ___________________________________________________    Attending Physician: Allene Aschoff, MD

## 2022-07-27 LAB
ALBUMIN SERPL-MCNC: 3 G/DL (ref 3.5–5)
ALBUMIN/GLOB SERPL: 0.9 {RATIO} (ref 1.1–2.2)
ALP SERPL-CCNC: 105 U/L (ref 45–117)
ALT SERPL-CCNC: 97 U/L (ref 12–78)
AMPHET UR QL SCN: NEGATIVE
ANION GAP SERPL CALC-SCNC: 2 MMOL/L (ref 5–15)
APPEARANCE UR: CLEAR
AST SERPL-CCNC: 91 U/L (ref 15–37)
BACTERIA URNS QL MICRO: NEGATIVE /HPF
BARBITURATES UR QL SCN: NEGATIVE
BENZODIAZ UR QL: NEGATIVE
BILIRUB SERPL-MCNC: 2 MG/DL (ref 0.2–1)
BILIRUB UR QL CFM: NEGATIVE
BUN SERPL-MCNC: 10 MG/DL (ref 6–20)
BUN/CREAT SERPL: 14 (ref 12–20)
CALCIUM SERPL-MCNC: 9.7 MG/DL (ref 8.5–10.1)
CANNABINOIDS UR QL SCN: NEGATIVE
CHLORIDE SERPL-SCNC: 116 MMOL/L (ref 97–108)
CO2 SERPL-SCNC: 28 MMOL/L (ref 21–32)
COCAINE UR QL SCN: NEGATIVE
COLOR UR: ABNORMAL
CREAT SERPL-MCNC: 0.7 MG/DL (ref 0.55–1.02)
DRUG SCRN COMMENT,DRGCM: ABNORMAL
EPITH CASTS URNS QL MICRO: ABNORMAL /LPF
ERYTHROCYTE [DISTWIDTH] IN BLOOD BY AUTOMATED COUNT: 13.8 % (ref 11.5–14.5)
EST. AVERAGE GLUCOSE BLD GHB EST-MCNC: 105 MG/DL
GLOBULIN SER CALC-MCNC: 3.2 G/DL (ref 2–4)
GLUCOSE SERPL-MCNC: 135 MG/DL (ref 65–100)
GLUCOSE UR STRIP.AUTO-MCNC: NEGATIVE MG/DL
HAV IGM SER QL: NONREACTIVE
HBA1C MFR BLD: 5.3 % (ref 4–5.6)
HBV CORE IGM SER QL: NONREACTIVE
HBV SURFACE AG SER QL: <0.1 INDEX
HBV SURFACE AG SER QL: NEGATIVE
HCT VFR BLD AUTO: 40.3 % (ref 35–47)
HCV AB SERPL QL IA: NONREACTIVE
HGB BLD-MCNC: 13.5 G/DL (ref 11.5–16)
HGB UR QL STRIP: NEGATIVE
HYALINE CASTS URNS QL MICRO: ABNORMAL /LPF (ref 0–5)
KETONES UR QL STRIP.AUTO: ABNORMAL MG/DL
LEUKOCYTE ESTERASE UR QL STRIP.AUTO: ABNORMAL
MAGNESIUM SERPL-MCNC: 1.9 MG/DL (ref 1.6–2.4)
MCH RBC QN AUTO: 29.7 PG (ref 26–34)
MCHC RBC AUTO-ENTMCNC: 33.5 G/DL (ref 30–36.5)
MCV RBC AUTO: 88.8 FL (ref 80–99)
METHADONE UR QL: NEGATIVE
NITRITE UR QL STRIP.AUTO: NEGATIVE
NRBC # BLD: 0 K/UL (ref 0–0.01)
NRBC BLD-RTO: 0 PER 100 WBC
OPIATES UR QL: POSITIVE
PCP UR QL: NEGATIVE
PH UR STRIP: 6 [PH] (ref 5–8)
PLATELET # BLD AUTO: 75 K/UL (ref 150–400)
PMV BLD AUTO: 10.6 FL (ref 8.9–12.9)
POTASSIUM SERPL-SCNC: 3.5 MMOL/L (ref 3.5–5.1)
PROT SERPL-MCNC: 6.2 G/DL (ref 6.4–8.2)
PROT UR STRIP-MCNC: ABNORMAL MG/DL
RBC # BLD AUTO: 4.54 M/UL (ref 3.8–5.2)
RBC #/AREA URNS HPF: ABNORMAL /HPF (ref 0–5)
SODIUM SERPL-SCNC: 146 MMOL/L (ref 136–145)
SP GR UR REFRACTOMETRY: 1.02 (ref 1–1.03)
SP1: NORMAL
SP2: NORMAL
SP3: NORMAL
UROBILINOGEN UR QL STRIP.AUTO: 1 EU/DL (ref 0.2–1)
WBC # BLD AUTO: 4.9 K/UL (ref 3.6–11)
WBC URNS QL MICRO: ABNORMAL /HPF (ref 0–4)

## 2022-07-27 PROCEDURE — 74011250636 HC RX REV CODE- 250/636: Performed by: INTERNAL MEDICINE

## 2022-07-27 PROCEDURE — 81001 URINALYSIS AUTO W/SCOPE: CPT

## 2022-07-27 PROCEDURE — 74011000258 HC RX REV CODE- 258: Performed by: INTERNAL MEDICINE

## 2022-07-27 PROCEDURE — 74011250637 HC RX REV CODE- 250/637: Performed by: INTERNAL MEDICINE

## 2022-07-27 PROCEDURE — 36415 COLL VENOUS BLD VENIPUNCTURE: CPT

## 2022-07-27 PROCEDURE — 65270000029 HC RM PRIVATE

## 2022-07-27 PROCEDURE — 85027 COMPLETE CBC AUTOMATED: CPT

## 2022-07-27 PROCEDURE — 99232 SBSQ HOSP IP/OBS MODERATE 35: CPT | Performed by: SURGERY

## 2022-07-27 PROCEDURE — 80307 DRUG TEST PRSMV CHEM ANLYZR: CPT

## 2022-07-27 PROCEDURE — 80053 COMPREHEN METABOLIC PANEL: CPT

## 2022-07-27 PROCEDURE — 83735 ASSAY OF MAGNESIUM: CPT

## 2022-07-27 PROCEDURE — 74011250636 HC RX REV CODE- 250/636: Performed by: SURGERY

## 2022-07-27 RX ADMIN — ONDANSETRON 4 MG: 4 TABLET, ORALLY DISINTEGRATING ORAL at 22:24

## 2022-07-27 RX ADMIN — PIPERACILLIN AND TAZOBACTAM 3.38 G: 3; .375 INJECTION, POWDER, FOR SOLUTION INTRAVENOUS at 01:36

## 2022-07-27 RX ADMIN — POTASSIUM CHLORIDE, DEXTROSE MONOHYDRATE AND SODIUM CHLORIDE 100 ML/HR: 150; 5; 450 INJECTION, SOLUTION INTRAVENOUS at 01:36

## 2022-07-27 RX ADMIN — HYDROMORPHONE HYDROCHLORIDE 1 MG: 1 INJECTION, SOLUTION INTRAMUSCULAR; INTRAVENOUS; SUBCUTANEOUS at 17:11

## 2022-07-27 RX ADMIN — PIPERACILLIN AND TAZOBACTAM 3.38 G: 3; .375 INJECTION, POWDER, FOR SOLUTION INTRAVENOUS at 17:11

## 2022-07-27 RX ADMIN — HYDROMORPHONE HYDROCHLORIDE 1 MG: 1 INJECTION, SOLUTION INTRAMUSCULAR; INTRAVENOUS; SUBCUTANEOUS at 22:24

## 2022-07-27 RX ADMIN — PIPERACILLIN AND TAZOBACTAM 3.38 G: 3; .375 INJECTION, POWDER, FOR SOLUTION INTRAVENOUS at 10:39

## 2022-07-27 RX ADMIN — POTASSIUM CHLORIDE, DEXTROSE MONOHYDRATE AND SODIUM CHLORIDE 100 ML/HR: 150; 5; 450 INJECTION, SOLUTION INTRAVENOUS at 16:46

## 2022-07-27 NOTE — PROGRESS NOTES
Problem: Falls - Risk of  Goal: *Absence of Falls  Description: Document Bard Corrigan Fall Risk and appropriate interventions in the flowsheet.   Outcome: Progressing Towards Goal  Note: Fall Risk Interventions:            Medication Interventions: Teach patient to arise slowly    Elimination Interventions: Patient to call for help with toileting needs              Problem: Pain  Goal: *Control of Pain  Outcome: Progressing Towards Goal

## 2022-07-27 NOTE — PROGRESS NOTES
Rounded on Sabianism patients and provided Anointing of the Sick at request of patient.     Hua Wang

## 2022-07-27 NOTE — PROGRESS NOTES
Bedside shift change report given to Brian Whitfield RN (oncoming nurse) by Melecio Velasco (offgoing nurse). Report included the following information SBAR, Kardex, Intake/Output, MAR, and Recent Results.

## 2022-07-27 NOTE — PROGRESS NOTES
Bedside and Verbal shift change report given to jo (oncoming nurse) by Poornima Farfan (offgoing nurse). Report included the following information SBAR, Kardex, Procedure Summary, Intake/Output, MAR, and Recent Results.

## 2022-07-27 NOTE — PROGRESS NOTES
7/27/2022   CARE MANAGEMENT NOTE:  CM reviewed EMR and handoff received from ER  Rand Bean). Pt was admitted with colitis. Reportedly, pt resides with her brothers and sisters    RUR 4%    Transition Plan of Care:  General Surgery, and GI following for medical management  Plan is for pt to return home with family  Outpatient follow up  Family will transport pt home    CM will continue to follow pt until discharged.   Precious

## 2022-07-27 NOTE — PROGRESS NOTES
Progress Note    Patient: Paula Hsu MRN: [id number]  SSN: [id number]    YOB: 1970  Age: 47 y.o. Sex: female      Admit Date: 2022    * No surgery found *    Procedure:      Subjective:     Ms. Carl Diaz feels better. She is still having some pain in her lower abdomen. She is tolerating clears, but is not hungry. Objective:     Visit Vitals  BP (!) 141/81 (BP 1 Location: Right leg, BP Patient Position: At rest)   Pulse 66   Temp 98.2 °F (36.8 °C)   Resp 17   Ht 5' 6\" (1.676 m)   Wt 190 lb (86.2 kg)   SpO2 93%   BMI 30.67 kg/m²       Temp (24hrs), Av.3 °F (36.8 °C), Min:98.1 °F (36.7 °C), Max:98.5 °F (36.9 °C)      Physical Exam:    GENERAL: alert, cooperative, no distress, EYE: negative, ABDOMEN: Soft, ND, mild RLQ , LLQ and epigastric tenderness without guarding. Data Review: reviewed  Ultrasound RUQ.     Lab Review:   CMP:   Lab Results   Component Value Date/Time     (H) 2022 01:28 AM    K 3.5 2022 01:28 AM     (H) 2022 01:28 AM    CO2 28 2022 01:28 AM    AGAP 2 (L) 2022 01:28 AM     (H) 2022 01:28 AM    BUN 10 2022 01:28 AM    CREA 0.70 2022 01:28 AM    GFRAA >60 2022 01:28 AM    GFRNA >60 2022 01:28 AM    CA 9.7 2022 01:28 AM    MG 1.9 2022 01:28 AM    PHOS 1.9 (L) 2022 12:58 PM    ALB 3.0 (L) 2022 01:28 AM    TP 6.2 (L) 2022 01:28 AM    GLOB 3.2 2022 01:28 AM    AGRAT 0.9 (L) 2022 01:28 AM    ALT 97 (H) 2022 01:28 AM     CBC:   Lab Results   Component Value Date/Time    WBC 4.9 2022 01:28 AM    HGB 13.5 2022 01:28 AM    HCT 40.3 2022 01:28 AM    PLT 75 (L) 2022 01:28 AM       Assessment:     Hospital Problems  Date Reviewed: 2022            Codes Class Noted POA    LFT elevation ICD-10-CM: R79.89  ICD-9-CM: 790.6  2022 Yes        Elevated troponin ICD-10-CM: R77.8  ICD-9-CM: 790.6  2022 Yes        Colitis ICD-10-CM: K52.9  ICD-9-CM: 558.9  7/26/2022 Yes        Abdominal pain ICD-10-CM: R10.9  ICD-9-CM: 789.00  7/26/2022 Yes        Steatosis ([address] ICD-10-CM: E88.89  ICD-9-CM: 272.8  7/26/2022 Yes        Hyperbilirubinemia ICD-10-CM: E80.6  ICD-9-CM: 782.4  7/26/2022 Yes        Hypercalcemia ICD-10-CM: E83.52  ICD-9-CM: 275.42  7/26/2022 Yes        Pyuria ICD-10-CM: R82.81  ICD-9-CM: 791.9  7/26/2022 Yes        Cholelithiasis ICD-10-CM: K80.20  ICD-9-CM: 574.20  7/26/2022 Yes        Nausea with vomiting ICD-10-CM: R11.2  ICD-9-CM: 787.01  7/26/2022 Yes        Cholecystitis ICD-10-CM: K81.9  ICD-9-CM: 575.10  7/26/2022 Yes           Plan/Recommendations/Medical Decision Making:     Feels better. Pain is improved and primarily in lower abdomen. Would treat colitis with antibiotics. Will need colonoscopy as outpatient per GI. Advance diet as tolerated. US without definitive cholecystitis. Can f/u with me as outpatient for elective cholecystectomy. Platelets low, Lovenox stopped. Check baseline coags.

## 2022-07-27 NOTE — PROGRESS NOTES
[facility]  [address], [facility] [address] [address]  [phone]    Medical Progress Note      NAME: Jerica Boothe   :  1970  MRM:  [id number]    Date/Time of service 2022  7:20 AM          Assessment and Plan:     Colitis / Abdominal pain / Nausea with vomiting - POA, unclear etiology. Infectious vs ischemic vs autiimmune. GI consulted. Checking labs. For now NPO, IVF and Zosyn. GI wants to do outpatient endoscopy. Steatosis / LFT elevation / Hyperbilirubinemia / thrombocytopenia - Noted on labs, could be acute, but I suspect underlying cirrhosis of some sort. GI consulted. Checking serologies, alcohol etc.  GI wants to do outpatient follow up     Cholelithiasis - Noted on CT but unclear if acute. ER consulted surgery. Thye think this is mainly inflammation referred from colon. They would defer any BG surgery until full resolution of colitis. Elevated troponin - POA, minimal elevation, due to strain in setting of vomiting and colitis. No further workup     Hypercalcemia - POA, may be some dehydration. Check PTH, Phos and monitor     Pyuria - Contaminate UA. Recheck. Subjective:     Chief Complaint:  abd pain    ROS:  (bold if positive, if negative)    Abd Pain  Tolerating PT   NPO        Objective:     Last 24hrs VS reviewed since prior progress note.  Most recent are:    Visit Vitals  BP (!) 141/81 (BP 1 Location: Right leg, BP Patient Position: At rest)   Pulse 66   Temp 98.2 °F (36.8 °C)   Resp 17   Ht 5' 6\" (1.676 m)   Wt 86.2 kg (190 lb)   SpO2 93%   BMI 30.67 kg/m²     SpO2 Readings from Last 6 Encounters:   22 93%          Intake/Output Summary (Last 24 hours) at 2022 0720  Last data filed at 2022  Gross per 24 hour   Intake 826.67 ml   Output --   Net 826.67 ml        Physical Exam:    Gen:  Well-developed, well-nourished, in no acute distress  HEENT:  Pink conjunctivae, PERRL, hearing intact to voice, moist mucous membranes  Neck:  Supple, without masses, thyroid non-tender  Resp:  No accessory muscle use, clear breath sounds without wheezes rales or rhonchi  Card:  No murmurs, normal S1, S2 without thrills, bruits or peripheral edema  Abd:  Soft, non-tender, non-distended, normoactive bowel sounds are present, no mass  Lymph:  No cervical or inguinal adenopathy  Musc:  No cyanosis or clubbing  Skin:  No rashes or ulcers, skin turgor is good  Neuro:  Cranial nerves are grossly intact, no focal motor weakness, follows commands appropriately  Psych:  Good insight, oriented to person, place and time, alert    Telemetry reviewed:   normal sinus rhythm  __________________________________________________________________  Medications Reviewed: (see below)  Medications:     Current Facility-Administered Medications   Medication Dose Route Frequency    acetaminophen (TYLENOL) tablet 650 mg  650 mg Oral Q6H PRN    Or    acetaminophen (TYLENOL) suppository 650 mg  650 mg Rectal Q6H PRN    polyethylene glycol (MIRALAX) packet 17 g  17 g Oral DAILY PRN    ondansetron (ZOFRAN ODT) tablet 4 mg  4 mg Oral Q8H PRN    Or    ondansetron (ZOFRAN) injection 4 mg  4 mg IntraVENous Q6H PRN    [Held by provider] enoxaparin (LOVENOX) injection 40 mg  40 mg SubCUTAneous DAILY    dextrose 5% - 0.45% NaCl with KCl 20 mEq/L infusion  100 mL/hr IntraVENous CONTINUOUS    piperacillin-tazobactam (ZOSYN) 3.375 g in 0.9% sodium chloride (MBP/ADV) 100 mL MBP  3.375 g IntraVENous Q8H    HYDROmorphone (DILAUDID) injection 1 mg  1 mg IntraVENous Q4H PRN        Lab Data Reviewed: (see below)  Lab Review:     Recent Labs     07/27/22  0128 07/26/22  0736   WBC 4.9 9.1   HGB 13.5 15.8   HCT 40.3 46.2   PLT 75* 129*     Recent Labs     07/27/22  0128 07/26/22  1258 07/26/22  0736   *  --  143   K 3.5  --  4.2   *  --  111*   CO2 28  --  27   *  --  137*   BUN 10  --  9   CREA 0.70  --  0.95   CA 9.7 9.9 11.0*   MG 1.9  --   --    PHOS  --  1.9*  -- ALB 3.0*  --  3.9   TBILI 2.0*  --  2.5*   ALT 97*  --  104*     No results found for: GLUCPOC  No results for input(s): PH, PCO2, PO2, HCO3, FIO2 in the last 72 hours. No results for input(s): INR, INREXT in the last 72 hours. All Micro Results       Procedure Component Value Units Date/Time    CULTURE, URINE [[id number]] Collected: 07/26/22 0736    Order Status: Sent Specimen: Cath Urine Updated: 07/27/22 0110    COVID-19 RAPID TEST [[id number]] Collected: 07/26/22 1337    Order Status: Completed Specimen: Nasopharyngeal Updated: 07/26/22 1416     Specimen source Nasopharyngeal        COVID-19 rapid test Not detected        Comment: Rapid Abbott ID Now       Rapid NAAT:  The specimen is NEGATIVE for SARS-CoV-2, the novel coronavirus associated with COVID-19. Negative results should be treated as presumptive and, if inconsistent with clinical signs and symptoms or necessary for patient management, should be tested with an alternative molecular assay. Negative results do not preclude SARS-CoV-2 infection and should not be used as the sole basis for patient management decisions. This test has been authorized by the FDA under an Emergency Use Authorization (EUA) for use by authorized laboratories. Fact sheet for Healthcare Providers: ConventionUpdate.co.nz  Fact sheet for Patients: ConventionUpdate.co.nz       Methodology: Isothermal Nucleic Acid Amplification         URINE CULTURE HOLD SAMPLE [[id number]] Collected: 07/26/22 0736    Order Status: Completed Specimen: Urine from Serum Updated: 07/26/22 0759     Urine culture hold       Urine on hold in Microbiology dept for 2 days. If unpreserved urine is submitted, it cannot be used for addtional testing after 24 hours, recollection will be required.                   Other pertinent lab: none    Total time spent with patient: 30 Minutes I personally reviewed chart, notes, data and current medications in the medical record. I have personally examined and treated the patient at bedside during this period. To assist coordination of care and communication with nursing and staff, this note may be preliminary early in the day, but finalized by end of the day.                  Care Plan discussed with: Patient, Family, Care Manager, Nursing Staff, Consultant/Specialist, and >50% of time spent in counseling and coordination of care    Discussed:  Care Plan and D/C Planning    Prophylaxis:  Lovenox and H2B/PPI    Disposition:  Home w/Family           ___________________________________________________    Attending Physician: Doyle Rincon MD

## 2022-07-28 LAB
APTT PPP: 26.3 SEC (ref 22.1–31)
BACTERIA SPEC CULT: NORMAL
CC UR VC: NORMAL
INR PPP: 1.1 (ref 0.9–1.1)
PROTHROMBIN TIME: 11.6 SEC (ref 9–11.1)
SERVICE CMNT-IMP: NORMAL
THERAPEUTIC RANGE,PTTT: NORMAL SECS (ref 58–77)

## 2022-07-28 PROCEDURE — 36415 COLL VENOUS BLD VENIPUNCTURE: CPT

## 2022-07-28 PROCEDURE — 65270000029 HC RM PRIVATE

## 2022-07-28 PROCEDURE — 99232 SBSQ HOSP IP/OBS MODERATE 35: CPT | Performed by: SURGERY

## 2022-07-28 PROCEDURE — 85610 PROTHROMBIN TIME: CPT

## 2022-07-28 PROCEDURE — 74011250637 HC RX REV CODE- 250/637: Performed by: INTERNAL MEDICINE

## 2022-07-28 PROCEDURE — 85730 THROMBOPLASTIN TIME PARTIAL: CPT

## 2022-07-28 PROCEDURE — 74011250636 HC RX REV CODE- 250/636: Performed by: SURGERY

## 2022-07-28 PROCEDURE — 74011250636 HC RX REV CODE- 250/636: Performed by: INTERNAL MEDICINE

## 2022-07-28 PROCEDURE — 74011000258 HC RX REV CODE- 258: Performed by: INTERNAL MEDICINE

## 2022-07-28 RX ORDER — AMOXICILLIN 250 MG
1 CAPSULE ORAL DAILY
Status: DISCONTINUED | OUTPATIENT
Start: 2022-07-28 | End: 2022-07-30 | Stop reason: HOSPADM

## 2022-07-28 RX ORDER — SIMETHICONE 80 MG
80 TABLET,CHEWABLE ORAL
Status: DISCONTINUED | OUTPATIENT
Start: 2022-07-28 | End: 2022-07-30 | Stop reason: HOSPADM

## 2022-07-28 RX ORDER — POLYETHYLENE GLYCOL 3350 17 G/17G
17 POWDER, FOR SOLUTION ORAL DAILY
Status: DISCONTINUED | OUTPATIENT
Start: 2022-07-28 | End: 2022-07-30 | Stop reason: HOSPADM

## 2022-07-28 RX ADMIN — POLYETHYLENE GLYCOL 3350 17 G: 17 POWDER, FOR SOLUTION ORAL at 14:45

## 2022-07-28 RX ADMIN — PIPERACILLIN AND TAZOBACTAM 3.38 G: 3; .375 INJECTION, POWDER, FOR SOLUTION INTRAVENOUS at 02:00

## 2022-07-28 RX ADMIN — HYDROMORPHONE HYDROCHLORIDE 1 MG: 1 INJECTION, SOLUTION INTRAMUSCULAR; INTRAVENOUS; SUBCUTANEOUS at 23:28

## 2022-07-28 RX ADMIN — POTASSIUM CHLORIDE, DEXTROSE MONOHYDRATE AND SODIUM CHLORIDE 100 ML/HR: 150; 5; 450 INJECTION, SOLUTION INTRAVENOUS at 17:59

## 2022-07-28 RX ADMIN — DOCUSATE SODIUM 50MG AND SENNOSIDES 8.6MG 1 TABLET: 8.6; 5 TABLET, FILM COATED ORAL at 14:45

## 2022-07-28 RX ADMIN — POTASSIUM CHLORIDE, DEXTROSE MONOHYDRATE AND SODIUM CHLORIDE 100 ML/HR: 150; 5; 450 INJECTION, SOLUTION INTRAVENOUS at 03:08

## 2022-07-28 RX ADMIN — PIPERACILLIN AND TAZOBACTAM 3.38 G: 3; .375 INJECTION, POWDER, FOR SOLUTION INTRAVENOUS at 11:29

## 2022-07-28 NOTE — PROGRESS NOTES
[facility]  [address], [place] [address]  [phone]    Medical Progress Note      NAME: Felicia Vidales   :  1970  MRM:  [id number]    Date/Time of service 2022  11:04 AM          Assessment and Plan:     Colitis / Abdominal pain / Nausea with vomiting - POA, unclear etiology. Infectious vs ischemic vs autoimmune. GI consulted. Checking labs. For now clears, IVF and Zosyn. ADAT. GI wants to do outpatient endoscopy. Steatosis / LFT elevation / Hyperbilirubinemia / thrombocytopenia - Noted on labs, could be acute, but I suspect underlying cirrhosis of some sort. GI consulted. Unremarkable serologies, alcohol etc.  GI wants to do outpatient follow up     Cholelithiasis - Noted on CT but unclear if acute. ER consulted surgery. Thye think this is mainly inflammation referred from colon. They would defer any BG surgery until full resolution of colitis. Thrombocytopenia - Worsened after hydration. Stopped lovenox. Check again. PCP to monitor. Elevated troponin - POA, minimal elevation, due to strain in setting of vomiting and colitis. No further workup     Hypercalcemia - POA, may be some dehydration. Check PTH, Phos and monitor     Pyuria - Contaminate UA. Recheck. Subjective:     Chief Complaint:  abd pain better, still feels bloated and constipated, anorexia    ROS:  (bold if positive, if negative)    Abd Pain  Tolerating PT   Tolerating some clear diet        Objective:     Last 24hrs VS reviewed since prior progress note.  Most recent are:    Visit Vitals  /72 (BP 1 Location: Right upper arm, BP Patient Position: At rest)   Pulse 71   Temp 99.5 °F (37.5 °C)   Resp 17   Ht 5' 6\" (1.676 m)   Wt 89.1 kg (196 lb 6.9 oz)   SpO2 96%   BMI 31.70 kg/m²     SpO2 Readings from Last 6 Encounters:   22 96%          Intake/Output Summary (Last 24 hours) at 2022 1104  Last data filed at 2022 0349  Gross per 24 hour   Intake 3731.67 ml   Output --   Net 3731.67 ml        Physical Exam:    Gen:  Well-developed, well-nourished, in no acute distress  HEENT:  Pink conjunctivae, PERRL, hearing intact to voice, moist mucous membranes  Neck:  Supple, without masses, thyroid non-tender  Resp:  No accessory muscle use, clear breath sounds without wheezes rales or rhonchi  Card:  No murmurs, normal S1, S2 without thrills, bruits or peripheral edema  Abd:  Soft, non-tender, non-distended, normoactive bowel sounds are present, no mass  Lymph:  No cervical or inguinal adenopathy  Musc:  No cyanosis or clubbing  Skin:  No rashes or ulcers, skin turgor is good  Neuro:  Cranial nerves are grossly intact, no focal motor weakness, follows commands appropriately  Psych:  Good insight, oriented to person, place and time, alert    Telemetry reviewed:   normal sinus rhythm  __________________________________________________________________  Medications Reviewed: (see below)  Medications:     Current Facility-Administered Medications   Medication Dose Route Frequency    simethicone (MYLICON) tablet 80 mg  80 mg Oral QID PRN    polyethylene glycol (MIRALAX) packet 17 g  17 g Oral DAILY    senna-docusate (PERICOLACE) 8.6-50 mg per tablet 1 Tablet  1 Tablet Oral DAILY    acetaminophen (TYLENOL) tablet 650 mg  650 mg Oral Q6H PRN    ondansetron (ZOFRAN) injection 4 mg  4 mg IntraVENous Q6H PRN    dextrose 5% - 0.45% NaCl with KCl 20 mEq/L infusion  100 mL/hr IntraVENous CONTINUOUS    piperacillin-tazobactam (ZOSYN) 3.375 g in 0.9% sodium chloride (MBP/ADV) 100 mL MBP  3.375 g IntraVENous Q8H    HYDROmorphone (DILAUDID) injection 1 mg  1 mg IntraVENous Q4H PRN        Lab Data Reviewed: (see below)  Lab Review:     Recent Labs     07/27/22  0128 07/26/22  0736   WBC 4.9 9.1   HGB 13.5 15.8   HCT 40.3 46.2   PLT 75* 129*     Recent Labs     07/28/22  0131 07/27/22  0128 07/26/22  1258 07/26/22  0736   NA  --  146*  --  143   K  --  3.5  --  4.2   CL  --  116*  -- 111*   CO2  --  28  --  27   GLU  --  135*  --  137*   BUN  --  10  --  9   CREA  --  0.70  --  0.95   CA  --  9.7 9.9 11.0*   MG  --  1.9  --   --    PHOS  --   --  1.9*  --    ALB  --  3.0*  --  3.9   TBILI  --  2.0*  --  2.5*   ALT  --  97*  --  104*   INR 1.1  --   --   --      No results found for: GLUCPOC  No results for input(s): PH, PCO2, PO2, HCO3, FIO2 in the last 72 hours. Recent Labs     07/28/22  0131   INR 1.1     All Micro Results       Procedure Component Value Units Date/Time    CULTURE, URINE [[id number]] Collected: 07/26/22 0736    Order Status: Completed Specimen: Cath Urine Updated: 07/28/22 0622     Special Requests: MUST COLLECT CATH URINE PLEASE     [place] Count --        >100,000  COLONIES/mL       Culture result:       MIXED UROGENITAL FARIDA ISOLATED          COVID-19 RAPID TEST [[id number]] Collected: 07/26/22 1337    Order Status: Completed Specimen: Nasopharyngeal Updated: 07/26/22 1416     Specimen source Nasopharyngeal        COVID-19 rapid test Not detected        Comment: Rapid Abbott ID Now       Rapid NAAT:  The specimen is NEGATIVE for SARS-CoV-2, the novel coronavirus associated with COVID-19. Negative results should be treated as presumptive and, if inconsistent with clinical signs and symptoms or necessary for patient management, should be tested with an alternative molecular assay. Negative results do not preclude SARS-CoV-2 infection and should not be used as the sole basis for patient management decisions. This test has been authorized by the FDA under an Emergency Use Authorization (EUA) for use by authorized laboratories.    Fact sheet for Healthcare Providers: ConventionUpdate.co.nz  Fact sheet for Patients: ConventionUpdate.co.nz       Methodology: Isothermal Nucleic Acid Amplification         URINE CULTURE HOLD SAMPLE [[id number]] Collected: 07/26/22 0736    Order Status: Completed Specimen: Urine from Serum Updated: 07/26/22 0759     Urine culture hold       Urine on hold in Microbiology dept for 2 days. If unpreserved urine is submitted, it cannot be used for addtional testing after 24 hours, recollection will be required. Other pertinent lab: none    Total time spent with patient: 30 Minutes I personally reviewed chart, notes, data and current medications in the medical record. I have personally examined and treated the patient at bedside during this period. To assist coordination of care and communication with nursing and staff, this note may be preliminary early in the day, but finalized by end of the day.                  Care Plan discussed with: Patient, Family, Care Manager, Nursing Staff, Consultant/Specialist, and >50% of time spent in counseling and coordination of care    Discussed:  Care Plan and D/C Planning    Prophylaxis:  Lovenox and H2B/PPI    Disposition:  Home w/Family           ___________________________________________________    Attending Physician: Doyle Rincon MD

## 2022-07-28 NOTE — PROGRESS NOTES
7/28/2022   CARE MANAGEMENT NOTE:  CM reviewed EMR. Pt was admitted with colitis. Reportedly, pt resides with her brothers and sisters     RUR 4%     Transition Plan of Care:  General Surgery, and GI following for medical management  Plan is for pt to return home with family  Outpatient follow up  Family will transport pt home     CM will continue to follow pt until discharged.   Precious

## 2022-07-28 NOTE — PROGRESS NOTES
Problem: Falls - Risk of  Goal: *Absence of Falls  Description: Document Andrews Jenkins Fall Risk and appropriate interventions in the flowsheet.   Outcome: Progressing Towards Goal  Note: Fall Risk Interventions:            Medication Interventions: Teach patient to arise slowly    Elimination Interventions: Call light in reach, Patient to call for help with toileting needs

## 2022-07-28 NOTE — PROGRESS NOTES
[facility]        Subjective     Some pain, although overall better  Some nausea, no emesis  Feels bloated    Objective     Patient Vitals for the past 24 hrs:   Temp Pulse Resp BP SpO2   07/28/22 0357 99.5 °F (37.5 °C) 71 17 125/72 96 %   07/28/22 0007 98.1 °F (36.7 °C) 69 19 (!) 144/89 94 %       Date 07/27/22 0700 - 07/28/22 0659 07/28/22 0700 - 07/29/22 0659   Shift 9689-9140 3246-9522 24 Hour Total 6227-3579 4009-4149 24 Hour Total   INTAKE   P.O.  150 150        P. O.  150 150      I. V.(mL/kg/hr)  3581.7(3.3) 3581.7(1.7)        Volume (dextrose 5% - 0.45% NaCl with KCl 20 mEq/L infusion)  3181.7 3181.7        Volume (piperacillin-tazobactam (ZOSYN) 3.375 g in 0.9% sodium chloride (MBP/ADV) 100 mL MBP)  400 400      Shift Total(mL/kg)  3731. 7(41.9) 3731. 7(41.9)      OUTPUT   Urine(mL/kg/hr)           Urine Occurrence(s)  2 x 2 x      Shift Total(mL/kg)         NET  3731.7 3731.7      Weight (kg) 89.1 89.1 89.1 89.1 89.1 89.1       PE  GEN - Awake, alert, communicating appropriately. NAD  Pulm - CTAB  CV - RRR  Abd - TTP BLQ, no guarding, slight distention      Labs  Recent Results (from the past 24 hour(s))   PROTHROMBIN TIME + INR    Collection Time: 07/28/22  1:31 AM   Result Value Ref Range    INR 1.1 0.9 - 1.1      Prothrombin time 11.6 (H) 9.0 - 11.1 sec   PTT    Collection Time: 07/28/22  1:31 AM   Result Value Ref Range    aPTT 26.3 22.1 - 31.0 sec    aPTT, therapeutic range     58.0 - 77.0 SECS         Assessment     Chalo Ingram is a 47 y. o.yr old female with colitis, infectious vs. Less likely ischedmic or IBD  Cholelithiasis, no evidence of cholecystitis  Still some tenderness on exam, but not particularly concerning    Plan     Ok to try full liquids  No plan for surgical intervention at this time  Follow exam    25 mins of time was spent with the patient of which > 50% of the time involved face-to-face counseling of the patient regarding the proposed treatment plan.     Kaya Zhong MD

## 2022-07-28 NOTE — PROGRESS NOTES
Bedside shift change report given to [facility] RN (oncoming nurse) by Ashley Hammer (offgoing nurse). Report included the following information SBAR, Kardex, MAR, Recent Results, and Med Rec Status.

## 2022-07-28 NOTE — PROGRESS NOTES
Spiritual Care Assessment/Progress Note  [address]      NAME: Charito Simpson      MRN: [id number]  AGE: 47 y.o.  SEX: female  Holiness Affiliation: Yarsanism   Language: Algerian     7/28/2022     Total Time (in minutes): 5     Spiritual Assessment begun in [facility]  MED SURG 2 through conversation with:         [x]Patient        [] Family    [] Friend(s)        Reason for Consult: [facility]     Spiritual beliefs: (Please include comment if needed)     [x] Identifies with a shilpi tradition:  Yarsanism       [] Supported by a shilpi community:            [] Claims no spiritual orientation:           [] Seeking spiritual identity:                [] Adheres to an individual form of spirituality:           [] Not able to assess:                           Identified resources for coping:      [x] Prayer                               [x] Music                  [] Guided Imagery     [x] Family/friends                 [] Pet visits     [] Devotional reading                         [] Unknown     [] Other:                                               Interventions offered during this visit: (See comments for more details)    Patient Interventions: Affirmation of shilpi, Communion (Yarsanism), Initial/Spiritual assessment, patient floor, Prayer (actual), Prayer (assurance of)           Plan of Care:     [x] Support spiritual and/or cultural needs    [] Support AMD and/or advance care planning process      [] Support grieving process   [] Coordinate Rites and/or Rituals    [] Coordination with community clergy   [] No spiritual needs identified at this time   [] Detailed Plan of Care below (See Comments)  [] Make referral to Music Therapy  [] Make referral to Pet Therapy     [] Make referral to Addiction services  [] Make referral to [facility]  [] Make referral to Spiritual Care Partner  [] No future visits requested        [] Contact Spiritual Care for further referrals     Comments:Mrs. Frank Robertson was in bed. She received prayer and communion.       ANNALEE Roth, RN, ACSW, LCSW   Page:  [phone])

## 2022-07-29 LAB
ALBUMIN SERPL-MCNC: 2.9 G/DL (ref 3.5–5)
ALBUMIN/GLOB SERPL: 0.9 {RATIO} (ref 1.1–2.2)
ALP SERPL-CCNC: 105 U/L (ref 45–117)
ALT SERPL-CCNC: 77 U/L (ref 12–78)
ANION GAP SERPL CALC-SCNC: 4 MMOL/L (ref 5–15)
AST SERPL-CCNC: 44 U/L (ref 15–37)
BILIRUB SERPL-MCNC: 1 MG/DL (ref 0.2–1)
BUN SERPL-MCNC: 3 MG/DL (ref 6–20)
BUN/CREAT SERPL: 5 (ref 12–20)
CALCIUM SERPL-MCNC: 10 MG/DL (ref 8.5–10.1)
CHLORIDE SERPL-SCNC: 116 MMOL/L (ref 97–108)
CO2 SERPL-SCNC: 27 MMOL/L (ref 21–32)
CREAT SERPL-MCNC: 0.63 MG/DL (ref 0.55–1.02)
ERYTHROCYTE [DISTWIDTH] IN BLOOD BY AUTOMATED COUNT: 14 % (ref 11.5–14.5)
GLOBULIN SER CALC-MCNC: 3.1 G/DL (ref 2–4)
GLUCOSE SERPL-MCNC: 109 MG/DL (ref 65–100)
HCT VFR BLD AUTO: 38.6 % (ref 35–47)
HGB BLD-MCNC: 13.2 G/DL (ref 11.5–16)
MAGNESIUM SERPL-MCNC: 1.6 MG/DL (ref 1.6–2.4)
MCH RBC QN AUTO: 30.3 PG (ref 26–34)
MCHC RBC AUTO-ENTMCNC: 34.2 G/DL (ref 30–36.5)
MCV RBC AUTO: 88.7 FL (ref 80–99)
NRBC # BLD: 0 K/UL (ref 0–0.01)
NRBC BLD-RTO: 0 PER 100 WBC
PHOSPHATE SERPL-MCNC: 2 MG/DL (ref 2.6–4.7)
PLATELET # BLD AUTO: 64 K/UL (ref 150–400)
PMV BLD AUTO: 11.3 FL (ref 8.9–12.9)
POTASSIUM SERPL-SCNC: 3.7 MMOL/L (ref 3.5–5.1)
PROT SERPL-MCNC: 6 G/DL (ref 6.4–8.2)
RBC # BLD AUTO: 4.35 M/UL (ref 3.8–5.2)
SODIUM SERPL-SCNC: 147 MMOL/L (ref 136–145)
WBC # BLD AUTO: 4.7 K/UL (ref 3.6–11)

## 2022-07-29 PROCEDURE — 80053 COMPREHEN METABOLIC PANEL: CPT

## 2022-07-29 PROCEDURE — 74011000258 HC RX REV CODE- 258: Performed by: INTERNAL MEDICINE

## 2022-07-29 PROCEDURE — 36415 COLL VENOUS BLD VENIPUNCTURE: CPT

## 2022-07-29 PROCEDURE — 83735 ASSAY OF MAGNESIUM: CPT

## 2022-07-29 PROCEDURE — 99232 SBSQ HOSP IP/OBS MODERATE 35: CPT | Performed by: SURGERY

## 2022-07-29 PROCEDURE — 74011250636 HC RX REV CODE- 250/636: Performed by: SURGERY

## 2022-07-29 PROCEDURE — 77030020847 HC STATLOK BARD -A

## 2022-07-29 PROCEDURE — 84100 ASSAY OF PHOSPHORUS: CPT

## 2022-07-29 PROCEDURE — 2709999900 HC NON-CHARGEABLE SUPPLY

## 2022-07-29 PROCEDURE — 74011250637 HC RX REV CODE- 250/637: Performed by: INTERNAL MEDICINE

## 2022-07-29 PROCEDURE — 74011250636 HC RX REV CODE- 250/636: Performed by: INTERNAL MEDICINE

## 2022-07-29 PROCEDURE — 65270000029 HC RM PRIVATE

## 2022-07-29 PROCEDURE — 85027 COMPLETE CBC AUTOMATED: CPT

## 2022-07-29 RX ADMIN — PIPERACILLIN AND TAZOBACTAM 3.38 G: 3; .375 INJECTION, POWDER, FOR SOLUTION INTRAVENOUS at 02:13

## 2022-07-29 RX ADMIN — PIPERACILLIN AND TAZOBACTAM 3.38 G: 3; .375 INJECTION, POWDER, FOR SOLUTION INTRAVENOUS at 12:44

## 2022-07-29 RX ADMIN — POLYETHYLENE GLYCOL 3350 17 G: 17 POWDER, FOR SOLUTION ORAL at 10:22

## 2022-07-29 RX ADMIN — HYDROMORPHONE HYDROCHLORIDE 1 MG: 1 INJECTION, SOLUTION INTRAMUSCULAR; INTRAVENOUS; SUBCUTANEOUS at 21:37

## 2022-07-29 RX ADMIN — PIPERACILLIN AND TAZOBACTAM 3.38 G: 3; .375 INJECTION, POWDER, FOR SOLUTION INTRAVENOUS at 20:40

## 2022-07-29 RX ADMIN — POTASSIUM CHLORIDE, DEXTROSE MONOHYDRATE AND SODIUM CHLORIDE 100 ML/HR: 150; 5; 450 INJECTION, SOLUTION INTRAVENOUS at 20:40

## 2022-07-29 RX ADMIN — POTASSIUM CHLORIDE, DEXTROSE MONOHYDRATE AND SODIUM CHLORIDE 100 ML/HR: 150; 5; 450 INJECTION, SOLUTION INTRAVENOUS at 06:56

## 2022-07-29 RX ADMIN — DOCUSATE SODIUM 50MG AND SENNOSIDES 8.6MG 1 TABLET: 8.6; 5 TABLET, FILM COATED ORAL at 10:22

## 2022-07-29 NOTE — PROGRESS NOTES
Ultrasound IV by Deep Issa RN :  Procedure Note    Patient meets criteria for US IV insertion. Ultrasound IV education provided to patient. Opportunities for questions given. IV ultrasound used for PIV placement:  20 gauge 8 cm Arrow Endurance Extended Dwell(may stay in place for 29 days)  Left cephalic location. 2 X Attempt(s). Flushed with ease; vigorous blood return. Procedure tolerated well. Primary RN aware of IV placement and added to LDA.      Juan Diego Mireles RN

## 2022-07-29 NOTE — PROGRESS NOTES
[facility]        Subjective     No acute events  Pain improved, some bloating feeling still  No N/V    Objective     Patient Vitals for the past 24 hrs:   Temp Pulse Resp BP SpO2   07/29/22 1100 97.6 °F (36.4 °C) 78 22 (!) 149/85 94 %   07/29/22 0651 98.6 °F (37 °C) 68 17 108/66 91 %   07/29/22 0255 98.6 °F (37 °C) 60 17 103/63 90 %   07/28/22 2326 98.7 °F (37.1 °C) 65 19 133/89 94 %   07/28/22 2041 98.2 °F (36.8 °C) 65 18 129/86 95 %   07/28/22 1614 98.4 °F (36.9 °C) 66 17 126/83 93 %   07/28/22 1208 98.4 °F (36.9 °C) 62 17 104/64 96 %       Date 07/28/22 0700 - 07/29/22 0659 07/29/22 0700 - 07/30/22 0659   Shift 0738-9356 5357-3189 24 Hour Total 1597-7264 9421-0027 24 Hour Total   INTAKE   P.O. 600 250 850 0  0     P. O. 600 250 850 0  0   I. V.(mL/kg/hr) 30(0) 1718.3(1.7) 1748.3(0.9)        I.V. 30  30        Volume (dextrose 5% - 0.45% NaCl with KCl 20 mEq/L infusion)  1618.3 1618.3        Volume (piperacillin-tazobactam (ZOSYN) 3.375 g in 0.9% sodium chloride (MBP/ADV) 100 mL MBP)  100 100      Blood 20  20        Autotransfused 20  20      Shift Total(mL/kg) 650(7.3) 6758.2(97.6) 2618. 3(30.8) 0(0)  0(0)   OUTPUT   Urine(mL/kg/hr)           Urine Occurrence(s) 1 x 3 x 4 x 1 x  1 x   Shift Total(mL/kg)          1968.3 2618.3 0  0   Weight (kg) 89.1 85.1 85.1 85.1 85.1 85.1       PE  GEN - Awake, alert, communicating appropriately.   NAD  Pulm - CTAB  CV - RRR  Abd - soft, mild distention, improved, mild TTP dharmesh-umbilical, no guarding      Labs  Recent Results (from the past 24 hour(s))   CBC W/O DIFF    Collection Time: 07/29/22  3:02 AM   Result Value Ref Range    WBC 4.7 3.6 - 11.0 K/uL    RBC 4.35 3.80 - 5.20 M/uL    HGB 13.2 11.5 - 16.0 g/dL    HCT 38.6 35.0 - 47.0 %    MCV 88.7 80.0 - 99.0 FL    MCH 30.3 26.0 - 34.0 PG    MCHC 34.2 30.0 - 36.5 g/dL    RDW 14.0 11.5 - 14.5 %    PLATELET 64 (L) 196 - 400 K/uL    MPV 11.3 8.9 - 12.9 FL    NRBC 0.0 0  WBC    ABSOLUTE NRBC 0.00 0.00 - 0.01 K/uL   MAGNESIUM    Collection Time: 07/29/22  3:02 AM   Result Value Ref Range    Magnesium 1.6 1.6 - 2.4 mg/dL   METABOLIC PANEL, COMPREHENSIVE    Collection Time: 07/29/22  3:02 AM   Result Value Ref Range    Sodium 147 (H) 136 - 145 mmol/L    Potassium 3.7 3.5 - 5.1 mmol/L    Chloride 116 (H) 97 - 108 mmol/L    CO2 27 21 - 32 mmol/L    Anion gap 4 (L) 5 - 15 mmol/L    Glucose 109 (H) 65 - 100 mg/dL    BUN 3 (L) 6 - 20 MG/DL    Creatinine 0.63 0.55 - 1.02 MG/DL    BUN/Creatinine ratio 5 (L) 12 - 20      GFR est AA >60 >60 ml/min/1.73m2    GFR est non-AA >60 >60 ml/min/1.73m2    Calcium 10.0 8.5 - 10.1 MG/DL    Bilirubin, total 1.0 0.2 - 1.0 MG/DL    ALT (SGPT) 77 12 - 78 U/L    AST (SGOT) 44 (H) 15 - 37 U/L    Alk. phosphatase 105 45 - 117 U/L    Protein, total 6.0 (L) 6.4 - 8.2 g/dL    Albumin 2.9 (L) 3.5 - 5.0 g/dL    Globulin 3.1 2.0 - 4.0 g/dL    A-G Ratio 0.9 (L) 1.1 - 2.2     PHOSPHORUS    Collection Time: 07/29/22  3:02 AM   Result Value Ref Range    Phosphorus 2.0 (L) 2.6 - 4.7 MG/DL         Assessment     Thalia Bhat is a 47 y. o.yr old female with colitis and cholelithiasis  Clinically continues to improve  Afebrile, no leukocytosis    Plan     Ok for regular diet  No need for acute surgical intervention  Likely discharge soon if tolerating diet and pain continues to improve/resolve    25 mins of time was spent with the patient of which > 50% of the time involved face-to-face counseling of the patient regarding the proposed treatment plan.     Sammie Santa MD

## 2022-07-29 NOTE — PROGRESS NOTES
7/29/2022   CARE MANAGEMENT NOTE:  CM reviewed EMR. Pt was admitted with colitis. Reportedly, pt resides with her brothers and sisters     RUR 6%; LOS 3 days     Transition Plan of Care:  General Surgery, and GI following for medical management  Plan is for pt to return home with family  Outpatient follow up  Family will transport pt home     CM will continue to follow pt until discharged.   Precious

## 2022-07-29 NOTE — PROGRESS NOTES
Bedside shift change report given to Amie OLIVARES (oncoming nurse) by Ulysses Stade (offgoing nurse). Report included the following information SBAR, Kardex, Intake/Output, MAR, and Recent Results.

## 2022-07-29 NOTE — PROGRESS NOTES
1530  Bedside and Verbal shift change report given to Kimber Chávez RN (oncoming nurse) by Blessing Rojas RN (offgoing nurse). Report included the following information SBAR, Kardex, Intake/Output, MAR, and Recent Results.

## 2022-07-29 NOTE — PROGRESS NOTES
[facility]  [address], [facility] [address]  [phone]    Medical Progress Note      NAME: Pascale Gomez   :  1970  MRM:  [id number]    Date/Time of service 2022  11:09 AM          Assessment and Plan:     Colitis / Abdominal pain / Nausea with vomiting - POA, unclear etiology. Infectious vs ischemic vs autoimmune. GI consulted. Following labs. For now full liquid diet, IVF and Zosyn. ADAT. GI wants to do outpatient endoscopy. She is slowly advancing PO intake. Hopefully home in 1-2 days      Steatosis / LFT elevation / Hyperbilirubinemia / thrombocytopenia - Noted on labs, could be acute, but I suspect underlying cirrhosis of some sort. GI consulted. Unremarkable serologies, alcohol etc.  GI wants to do outpatient follow up     Cholelithiasis - Noted on CT but unclear if acute. ER consulted surgery. Thye think this is mainly inflammation referred from colon. They would defer any BG surgery until full resolution of colitis. Thrombocytopenia - Worsened after hydration. Stopped lovenox. Check again. PCP to monitor. Elevated troponin - POA, minimal elevation, due to strain in setting of vomiting and colitis. No further workup     Hypercalcemia - POA, may be some dehydration. Check PTH, Phos and monitor     Pyuria - Contaminate UA. Recheck. Subjective:     Chief Complaint:  abd pain better, still feels bloated and constipated, anorexia persists    ROS:  (bold if positive, if negative)    Abd Pain  Tolerating PT   Tolerating some full liquid diet        Objective:     Last 24hrs VS reviewed since prior progress note.  Most recent are:    Visit Vitals  BP (!) 149/85 (BP 1 Location: Right upper arm, BP Patient Position: At rest)   Pulse 78   Temp 97.6 °F (36.4 °C)   Resp 22   Ht 5' 6\" (1.676 m)   Wt 85.1 kg (187 lb 9.8 oz)   SpO2 94%   BMI 30.28 kg/m²     SpO2 Readings from Last 6 Encounters:   22 94%          Intake/Output Summary (Last 24 hours) at 7/29/2022 1109  Last data filed at 7/29/2022 8134  Gross per 24 hour   Intake 2288.33 ml   Output --   Net 2288.33 ml          Physical Exam:    Gen:  Obese, in no acute distress  HEENT:  Pink conjunctivae, PERRL, hearing intact to voice, moist mucous membranes  Neck:  Supple, without masses, thyroid non-tender  Resp:  No accessory muscle use, clear breath sounds without wheezes rales or rhonchi  Card:  No murmurs, normal S1, S2 without thrills, bruits or peripheral edema  Abd:  Soft, non-tender, mildly distended, normoactive bowel sounds are present, no mass  Lymph:  No cervical or inguinal adenopathy  Musc:  No cyanosis or clubbing  Skin:  No rashes or ulcers, skin turgor is good  Neuro:  Cranial nerves are grossly intact,  mild motor weakness, follows commands appropriately  Psych:  Good insight, oriented to person, place and time, alert    Telemetry reviewed:   normal sinus rhythm  __________________________________________________________________  Medications Reviewed: (see below)  Medications:     Current Facility-Administered Medications   Medication Dose Route Frequency    simethicone (MYLICON) tablet 80 mg  80 mg Oral QID PRN    polyethylene glycol (MIRALAX) packet 17 g  17 g Oral DAILY    senna-docusate (PERICOLACE) 8.6-50 mg per tablet 1 Tablet  1 Tablet Oral DAILY    acetaminophen (TYLENOL) tablet 650 mg  650 mg Oral Q6H PRN    ondansetron (ZOFRAN) injection 4 mg  4 mg IntraVENous Q6H PRN    dextrose 5% - 0.45% NaCl with KCl 20 mEq/L infusion  100 mL/hr IntraVENous CONTINUOUS    piperacillin-tazobactam (ZOSYN) 3.375 g in 0.9% sodium chloride (MBP/ADV) 100 mL MBP  3.375 g IntraVENous Q8H    HYDROmorphone (DILAUDID) injection 1 mg  1 mg IntraVENous Q4H PRN        Lab Data Reviewed: (see below)  Lab Review:     Recent Labs     07/29/22  0302 07/27/22  0128   WBC 4.7 4.9   HGB 13.2 13.5   HCT 38.6 40.3   PLT 64* 75*       Recent Labs     07/29/22  0302 07/28/22  0131 07/27/22  0128 07/26/22  1258   *  --  146*  --    K 3.7  --  3.5  --    *  --  116*  --    CO2 27  --  28  --    *  --  135*  --    BUN 3*  --  10  --    CREA 0.63  --  0.70  --    CA 10.0  --  9.7 9.9   MG 1.6  --  1.9  --    PHOS 2.0*  --   --  1.9*   ALB 2.9*  --  3.0*  --    TBILI 1.0  --  2.0*  --    ALT 77  --  97*  --    INR  --  1.1  --   --        No results found for: GLUCPOC  No results for input(s): PH, PCO2, PO2, HCO3, FIO2 in the last 72 hours. Recent Labs     07/28/22  0131   INR 1.1       All Micro Results       Procedure Component Value Units Date/Time    CULTURE, URINE [[id number]] Collected: 07/26/22 0736    Order Status: Completed Specimen: Cath Urine Updated: 07/28/22 0622     Special Requests: MUST COLLECT CATH URINE PLEASE     [place] Count --        >100,000  COLONIES/mL       Culture result:       MIXED UROGENITAL FARIDA ISOLATED          COVID-19 RAPID TEST [[id number]] Collected: 07/26/22 1337    Order Status: Completed Specimen: Nasopharyngeal Updated: 07/26/22 1416     Specimen source Nasopharyngeal        COVID-19 rapid test Not detected        Comment: Rapid Abbott ID Now       Rapid NAAT:  The specimen is NEGATIVE for SARS-CoV-2, the novel coronavirus associated with COVID-19. Negative results should be treated as presumptive and, if inconsistent with clinical signs and symptoms or necessary for patient management, should be tested with an alternative molecular assay. Negative results do not preclude SARS-CoV-2 infection and should not be used as the sole basis for patient management decisions. This test has been authorized by the FDA under an Emergency Use Authorization (EUA) for use by authorized laboratories.    Fact sheet for Healthcare Providers: ConventionUpdate.co.nz  Fact sheet for Patients: ConventionUpdate.co.nz       Methodology: Isothermal Nucleic Acid Amplification         URINE CULTURE HOLD SAMPLE [[id number]] Collected: 07/26/22 0736    Order Status: Completed Specimen: Urine from Serum Updated: 07/26/22 0759     Urine culture hold       Urine on hold in Microbiology dept for 2 days. If unpreserved urine is submitted, it cannot be used for addtional testing after 24 hours, recollection will be required. Other pertinent lab: none    Total time spent with patient: 30 Minutes I personally reviewed chart, notes, data and current medications in the medical record. I have personally examined and treated the patient at bedside during this period. To assist coordination of care and communication with nursing and staff, this note may be preliminary early in the day, but finalized by end of the day.                  Care Plan discussed with: Patient, Family, Care Manager, Nursing Staff, Consultant/Specialist, and >50% of time spent in counseling and coordination of care    Discussed:  Care Plan and D/C Planning    Prophylaxis:  Lovenox and H2B/PPI    Disposition:  Home w/Family           ___________________________________________________    Attending Physician: Mile Way MD

## 2022-07-29 NOTE — PROGRESS NOTES
Problem: Falls - Risk of  Goal: *Absence of Falls  Description: Document Clydene Bone Fall Risk and appropriate interventions in the flowsheet.   Outcome: Progressing Towards Goal  Note: Fall Risk Interventions:            Medication Interventions: Teach patient to arise slowly    Elimination Interventions: Call light in reach, Patient to call for help with toileting needs              Problem: Pain  Goal: *Control of Pain  Outcome: Progressing Towards Goal

## 2022-07-30 VITALS
BODY MASS INDEX: 30.15 KG/M2 | HEIGHT: 66 IN | HEART RATE: 62 BPM | TEMPERATURE: 98 F | OXYGEN SATURATION: 97 % | WEIGHT: 187.61 LBS | SYSTOLIC BLOOD PRESSURE: 121 MMHG | DIASTOLIC BLOOD PRESSURE: 76 MMHG | RESPIRATION RATE: 18 BRPM

## 2022-07-30 PROCEDURE — 74011250636 HC RX REV CODE- 250/636: Performed by: INTERNAL MEDICINE

## 2022-07-30 PROCEDURE — 74011000258 HC RX REV CODE- 258: Performed by: INTERNAL MEDICINE

## 2022-07-30 PROCEDURE — 74011250637 HC RX REV CODE- 250/637: Performed by: INTERNAL MEDICINE

## 2022-07-30 RX ORDER — AMOXICILLIN AND CLAVULANATE POTASSIUM 875; 125 MG/1; MG/1
1 TABLET, FILM COATED ORAL 2 TIMES DAILY
Qty: 10 TABLET | Refills: 0 | Status: SHIPPED | OUTPATIENT
Start: 2022-07-30 | End: 2022-08-04

## 2022-07-30 RX ADMIN — DOCUSATE SODIUM 50MG AND SENNOSIDES 8.6MG 1 TABLET: 8.6; 5 TABLET, FILM COATED ORAL at 09:29

## 2022-07-30 RX ADMIN — POTASSIUM CHLORIDE, DEXTROSE MONOHYDRATE AND SODIUM CHLORIDE 100 ML/HR: 150; 5; 450 INJECTION, SOLUTION INTRAVENOUS at 05:12

## 2022-07-30 RX ADMIN — PIPERACILLIN AND TAZOBACTAM 3.38 G: 3; .375 INJECTION, POWDER, FOR SOLUTION INTRAVENOUS at 05:12

## 2022-07-30 RX ADMIN — POLYETHYLENE GLYCOL 3350 17 G: 17 POWDER, FOR SOLUTION ORAL at 09:29

## 2022-07-30 NOTE — PROGRESS NOTES
Problem: Falls - Risk of  Goal: *Absence of Falls  Description: Document Rianna Miller Fall Risk and appropriate interventions in the flowsheet.   Outcome: Progressing Towards Goal  Note: Fall Risk Interventions:            Medication Interventions: Bed/chair exit alarm, Patient to call before getting OOB, Teach patient to arise slowly, Utilize gait belt for transfers/ambulation    Elimination Interventions: Bed/chair exit alarm, Call light in reach, Patient to call for help with toileting needs              Problem: Patient Education: Go to Patient Education Activity  Goal: Patient/Family Education  Outcome: Progressing Towards Goal     Problem: Pain  Goal: *Control of Pain  Outcome: Progressing Towards Goal  Goal: *PALLIATIVE CARE:  Alleviation of Pain  Outcome: Progressing Towards Goal     Problem: Patient Education: Go to Patient Education Activity  Goal: Patient/Family Education  Outcome: Progressing Towards Goal     Problem: Hypertension  Goal: *Blood pressure within specified parameters  Outcome: Progressing Towards Goal  Goal: *Fluid volume balance  Outcome: Progressing Towards Goal  Goal: *Labs within defined limits  Outcome: Progressing Towards Goal     Problem: Patient Education: Go to Patient Education Activity  Goal: Patient/Family Education  Outcome: Progressing Towards Goal     Problem: Aspiration - Risk of  Goal: *Absence of aspiration  Outcome: Progressing Towards Goal     Problem: Patient Education: Go to Patient Education Activity  Goal: Patient/Family Education  Outcome: Progressing Towards Goal

## 2022-07-30 NOTE — DISCHARGE SUMMARY
Physician Discharge Summary     Patient ID:  Carin Pedrofast  [id number]  47 y.o.  1970    Admit date: 7/26/2022    Discharge date of service and time: 7/30/2022    Admission Diagnoses: Colitis [K52.9]    Discharge Diagnoses:    Principal Diagnosis     Colitis                                              Hospital Course and other diagnoses  Colitis / Abdominal pain / Nausea with vomiting - POA, unclear etiology. Infectious vs ischemic vs autoimmune. GI and surgery consulted. Following labs. Tolerating full liquid diet, IVF and Zosyn. ADAT. GI wants to do outpatient endoscopy. She is slowly advancing PO intake. Home today      Steatosis / LFT elevation / Hyperbilirubinemia / thrombocytopenia - Noted on labs, could be acute, but I suspect underlying cirrhosis of some sort. GI consulted. Unremarkable serologies, alcohol etc.  GI wants to do outpatient follow up     Cholelithiasis - Noted on CT but unclear if acute. ER consulted surgery. Thye think this is mainly inflammation referred from colon. They would defer any BG surgery until full resolution of colitis. Thrombocytopenia - Worsened after hydration. Stopped lovenox. Check again. PCP to monitor. Elevated troponin - POA, minimal elevation, due to strain in setting of vomiting and colitis. No further workup     Hypercalcemia - POA, may be some dehydration. Check PTH, Phos and monitor     Pyuria - Contaminate UA. Recheck. PCP: None    Consults: GI and General Surgery    Significant Diagnostic Studies: See Hospital Course    Discharged home in improved condition.     Discharge Exam:  /76 (BP 1 Location: Right upper arm, BP Patient Position: At rest)   Pulse 62   Temp 98 °F (36.7 °C)   Resp 18   Ht 5' 6\" (1.676 m)   Wt 85.1 kg (187 lb 9.8 oz)   SpO2 97%   BMI 30.28 kg/m²      Gen:  Obese, in no acute distress  HEENT:  Pink conjunctivae, PERRL, hearing intact to voice, moist mucous membranes  Neck:  Supple, without masses, thyroid non-tender  Resp:  No accessory muscle use, clear breath sounds without wheezes rales or rhonchi  Card:  No murmurs, normal S1, S2 without thrills, bruits or peripheral edema  Abd:  Soft, non-tender, mildly distended, normoactive bowel sounds are present, no mass  Lymph:  No cervical or inguinal adenopathy  Musc:  No cyanosis or clubbing  Skin:  No rashes or ulcers, skin turgor is good  Neuro:  Cranial nerves are grossly intact,  mild motor weakness, follows commands appropriately  Psych:  Good insight, oriented to person, place and time, alert    Patient Instructions:   Current Discharge Medication List        START taking these medications    Details   amoxicillin-clavulanate (Augmentin) 875-125 mg per tablet Take 1 Tablet by mouth two (2) times a day for 5 days. Qty: 10 Tablet, Refills: 0           Activity: Activity as tolerated  Diet: Regular Diet, Increase noncaffeinated fluids, Low fat, Low cholesterol, and Encourage fluids  Wound Care: None needed    Follow-up with your PCP and Dr Karlos Davies in GI in a few weeks.   Follow-up tests/labs - none    Signed:  Thomas Dumont MD  7/30/2022  10:00 AM

## 2022-07-30 NOTE — PROGRESS NOTES
[facility]  [address] [facility], [facility]  [phone]    Medical Progress Note      NAME: Terry Childs   :  1970  MRM:  [id number]    Date/Time of service 2022  9:56 AM          Assessment and Plan:     Colitis / Abdominal pain / Nausea with vomiting - POA, unclear etiology. Infectious vs ischemic vs autoimmune. GI and surgery consulted. Following labs. Tolerating full liquid diet, IVF and Zosyn. ADAT. GI wants to do outpatient endoscopy. She is slowly advancing PO intake. Home today      Steatosis / LFT elevation / Hyperbilirubinemia / thrombocytopenia - Noted on labs, could be acute, but I suspect underlying cirrhosis of some sort. GI consulted. Unremarkable serologies, alcohol etc.  GI wants to do outpatient follow up     Cholelithiasis - Noted on CT but unclear if acute. ER consulted surgery. Thye think this is mainly inflammation referred from colon. They would defer any BG surgery until full resolution of colitis. Thrombocytopenia - Worsened after hydration. Stopped lovenox. Check again. PCP to monitor. Elevated troponin - POA, minimal elevation, due to strain in setting of vomiting and colitis. No further workup     Hypercalcemia - POA, may be some dehydration. Check PTH, Phos and monitor     Pyuria - Contaminate UA. Recheck. Subjective:     Chief Complaint:  abd pain better, less bloated and constipated, eating better    ROS:  (bold if positive, if negative)    Abd Pain  Tolerating PT   Tolerating full liquid diet        Objective:     Last 24hrs VS reviewed since prior progress note.  Most recent are:    Visit Vitals  /76 (BP 1 Location: Right upper arm, BP Patient Position: At rest)   Pulse 62   Temp 98 °F (36.7 °C)   Resp 18   Ht 5' 6\" (1.676 m)   Wt 85.1 kg (187 lb 9.8 oz)   SpO2 97%   BMI 30.28 kg/m²     SpO2 Readings from Last 6 Encounters:   22 97%          Intake/Output Summary (Last 24 hours) at 7/30/2022 0730  Last data filed at 7/30/2022 4998  Gross per 24 hour   Intake 1897.66 ml   Output 0 ml   Net 1897.66 ml          Physical Exam:    Gen:  Obese, in no acute distress  HEENT:  Pink conjunctivae, PERRL, hearing intact to voice, moist mucous membranes  Neck:  Supple, without masses, thyroid non-tender  Resp:  No accessory muscle use, clear breath sounds without wheezes rales or rhonchi  Card:  No murmurs, normal S1, S2 without thrills, bruits or peripheral edema  Abd:  Soft, non-tender, mildly distended, normoactive bowel sounds are present, no mass  Lymph:  No cervical or inguinal adenopathy  Musc:  No cyanosis or clubbing  Skin:  No rashes or ulcers, skin turgor is good  Neuro:  Cranial nerves are grossly intact,  mild motor weakness, follows commands appropriately  Psych:  Good insight, oriented to person, place and time, alert    Telemetry reviewed:   normal sinus rhythm  __________________________________________________________________  Medications Reviewed: (see below)  Medications:     Current Facility-Administered Medications   Medication Dose Route Frequency    simethicone (MYLICON) tablet 80 mg  80 mg Oral QID PRN    polyethylene glycol (MIRALAX) packet 17 g  17 g Oral DAILY    senna-docusate (PERICOLACE) 8.6-50 mg per tablet 1 Tablet  1 Tablet Oral DAILY    acetaminophen (TYLENOL) tablet 650 mg  650 mg Oral Q6H PRN    ondansetron (ZOFRAN) injection 4 mg  4 mg IntraVENous Q6H PRN    dextrose 5% - 0.45% NaCl with KCl 20 mEq/L infusion  100 mL/hr IntraVENous CONTINUOUS    piperacillin-tazobactam (ZOSYN) 3.375 g in 0.9% sodium chloride (MBP/ADV) 100 mL MBP  3.375 g IntraVENous Q8H    HYDROmorphone (DILAUDID) injection 1 mg  1 mg IntraVENous Q4H PRN        Lab Data Reviewed: (see below)  Lab Review:     Recent Labs     07/29/22  0302   WBC 4.7   HGB 13.2   HCT 38.6   PLT 64*       Recent Labs     07/29/22 0302 07/28/22  0131   *  --    K 3.7  --    *  --    CO2 27  --    *  -- BUN 3*  --    CREA 0.63  --    CA 10.0  --    MG 1.6  --    PHOS 2.0*  --    ALB 2.9*  --    TBILI 1.0  --    ALT 77  --    INR  --  1.1       No results found for: GLUCPOC  No results for input(s): PH, PCO2, PO2, HCO3, FIO2 in the last 72 hours. Recent Labs     07/28/22  0131   INR 1.1       All Micro Results       Procedure Component Value Units Date/Time    CULTURE, URINE [[id number]] Collected: 07/26/22 0736    Order Status: Completed Specimen: Cath Urine Updated: 07/28/22 0622     Special Requests: MUST COLLECT CATH URINE PLEASE     Seligman Count --        >100,000  COLONIES/mL       Culture result:       MIXED UROGENITAL FARIDA ISOLATED          COVID-19 RAPID TEST [[id number]] Collected: 07/26/22 1337    Order Status: Completed Specimen: Nasopharyngeal Updated: 07/26/22 1416     Specimen source Nasopharyngeal        COVID-19 rapid test Not detected        Comment: Rapid Abbott ID Now       Rapid NAAT:  The specimen is NEGATIVE for SARS-CoV-2, the novel coronavirus associated with COVID-19. Negative results should be treated as presumptive and, if inconsistent with clinical signs and symptoms or necessary for patient management, should be tested with an alternative molecular assay. Negative results do not preclude SARS-CoV-2 infection and should not be used as the sole basis for patient management decisions. This test has been authorized by the FDA under an Emergency Use Authorization (EUA) for use by authorized laboratories. Fact sheet for Healthcare Providers: ConventionUpdate.co.nz  Fact sheet for Patients: ConventionUpdate.co.nz       Methodology: Isothermal Nucleic Acid Amplification         URINE CULTURE HOLD SAMPLE [[id number]] Collected: 07/26/22 0736    Order Status: Completed Specimen: Urine from Serum Updated: 07/26/22 0750     Urine culture hold       Urine on hold in Microbiology dept for 2 days.   If unpreserved urine is submitted, it cannot be used for addtional testing after 24 hours, recollection will be required. Other pertinent lab: none    Total time spent with patient: 30 Minutes I personally reviewed chart, notes, data and current medications in the medical record. I have personally examined and treated the patient at bedside during this period. To assist coordination of care and communication with nursing and staff, this note may be preliminary early in the day, but finalized by end of the day.                  Care Plan discussed with: Patient, Family, Care Manager, Nursing Staff, Consultant/Specialist, and >50% of time spent in counseling and coordination of care    Discussed:  Care Plan and D/C Planning    Prophylaxis:  Lovenox and H2B/PPI    Disposition:  Home w/Family           ___________________________________________________    Attending Physician: Ed Gonsales MD

## 2022-07-30 NOTE — DISCHARGE INSTRUCTIONS
Patient Discharge Instructions    Jl Pereira / [id number] : 1970    Admitted 2022 Discharged: 2022     Primary Diagnoses  @Rprob@    Take Home Medications     It is important that you take the medication exactly as they are prescribed. Keep your medication in the bottles provided by the pharmacist and keep a list of the medication names, dosages, and times to be taken in your wallet. Do not take other medications without consulting your doctor. What to do at Home    Recommended diet: Clear liquids, advance as tolerated, Increase noncaffeinated fluids, and Low fat, Low cholesterol    Recommended activity: Activity as tolerated    If you experience worse pain, please follow up with any PCP or Dr Hima Yarbrough in surgery. Follow-up with your PCP and Dr Juarez Weiss in GI in a few weeks         Aprenda acerca de la colitis  Learning About Colitis  ¿Qué es la colitis? La colitis es la hinchazón (inflamación) del colon. El colon constituye la mayor parte del intestino grueso. Muchas afecciones pueden causar colitis. ¿Cuáles son algunos tipos de colitis? La colitis infecciosa es un tipo que aparece de repente. Es causada por chantel bacteria o un virus, marcia salmonela, sigela o campylobacter. La colitis isquémica es causada por problemas con el flujo sanguíneo al colon. Puede presentarse después de chantel operación. 1475 por otros problemas de [facility]. La colitis microscópica no siempre tiene chantel causa hernan. Solo se puede detectar con pruebas especiales. La enfermedad de Crohn y la colitis ulcerosa son enfermedades de por lennie. Pueden causar hinchazón, inflamación y llagas profundas en el revestimiento del tubo digestivo. ¿Cuáles son los síntomas? Los síntomas pueden incluir fiebre, diarrea que puede contener beverley o dolor abdominal.  Usted también podría tener chantel necesidad urgente de defecar o dolor al [place]. O puede tener sangrado rectal o pérdida de peso.   Sintia síntomas pueden depender del tipo de colitis que tenga. Por ejemplo, la colitis microscópica puede causar diarrea acuosa. A veces los síntomas desaparecen por sí solos. Si no desaparecen, o si tiene sangrado o dolor intenso, llame a damon médico de inmediato. ¿Cómo se diagnostica? Es posible que necesite análisis de beverley o chantel prueba de materia fecal. También podría necesitar pruebas de diagnóstico por imágenes marcia chantel tomografía computarizada. Nancy [facility] se le sofia chantel colonoscopia para que un médico pueda examinar el interior del colon. En algunos casos, es posible que el médico quiera analizar [place] de tejido intestinal. Esta prueba se llama biopsia. ¿Cómo se trata? El tratamiento para la colitis depende de la afección que la esté causando. Pueden usarse antibióticos para tratar [place]. Los [employer] en la alimentación pueden [employer] síntomas. Los medicamentos también pueden ayudar a aliviar la inflamación y a controlar los síntomas. En algunos casos, podría ser necesario operar para extirpar partes del intestino. La atención de seguimiento es chantel parte clave de damon tratamiento y seguridad. Asegúrese de hacer y acudir a todas las citas, y llame a damon médico si está teniendo problemas. También es chantel buena idea saber los resultados de kojo exámenes y mantener chantel lista de los medicamentos que peng. ¿Dónde puede encontrar más información en inglés? Bg Simpson a http://www.gray.com/  [place] [address] en la búsqueda para aprender más acerca de \"Aprenda acerca de la colitis. \"  Revisado: 8 septiembre, 2021               Versión del contenido: 13.2  © 0750-3709 Healthwise, Incorporated. Las instrucciones de cuidado fueron adaptadas bajo licencia por [employer] (which disclaims liability or warranty for this information). Si usted tiene [place] afección médica o sobre estas instrucciones, siempre pregunte a damon profesional de alessandro.  [employer] niega toda garantía o responsabilidad por damon uso de esta información. [unfilled]     Information obtained by :  I understand that if any problems occur once I am at home I am to contact my physician. I understand and acknowledge receipt of the instructions indicated above.                                                                                                                                            Physician's or R.N.'s Signature                                                                  Date/Time                                                                                                                                              Patient or Representative Signature                                                          Date/Time

## 2022-07-30 NOTE — PROGRESS NOTES
Care Management Interventions  PCP Verified by CM: Yes (NO PCP)  Mode of Transport at Discharge:  Other (see comment) (family)  Transition of Care Consult (CM Consult): Discharge Planning  MyChart Signup: No  Discharge Durable Medical Equipment: No  Physical Therapy Consult: No  Occupational Therapy Consult: No  Speech Therapy Consult: No  Support Systems: Other Family Member(s), Child(satinder)  Confirm Follow Up Transport: Family  Discharge Location  Patient Expects to be Discharged to[de-identified] Home

## 2022-07-30 NOTE — PROGRESS NOTES
Bedside and Verbal shift change report given to MARSHALL Ibrahim (oncoming nurse) by [facility]. THERESARN (offgoing nurse). Report included the following information SBAR, Kardex, Intake/Output, MAR, and Accordion.

## 2022-07-30 NOTE — PROGRESS NOTES
1140  Discharge instructions/AVS discussed in detail with pt. Pt verbalizes understanding of all instructions, including where to get new medications. Pt denies any questions about instructions and has signed paper copy AVS. Pt discharged per MD order, being driven home by family - assisted to car by volunteers with wheelchair. Pt in no apparent distress at time of discharge with no complaints. IV removed. Paper prescription given to patient.

## 2022-08-07 ENCOUNTER — APPOINTMENT (OUTPATIENT)
Dept: CT IMAGING | Age: 52
End: 2022-08-07

## 2022-08-07 ENCOUNTER — HOSPITAL ENCOUNTER (EMERGENCY)
Age: 52
Discharge: HOME | End: 2022-08-07
Attending: STUDENT IN AN ORGANIZED HEALTH CARE EDUCATION/TRAINING PROGRAM

## 2022-08-07 VITALS
SYSTOLIC BLOOD PRESSURE: 125 MMHG | WEIGHT: 180 LBS | DIASTOLIC BLOOD PRESSURE: 59 MMHG | OXYGEN SATURATION: 98 % | HEART RATE: 98 BPM | BODY MASS INDEX: 28.93 KG/M2 | HEIGHT: 66 IN | TEMPERATURE: 98.9 F | RESPIRATION RATE: 16 BRPM

## 2022-08-07 DIAGNOSIS — R51.9 ACUTE NONINTRACTABLE HEADACHE, UNSPECIFIED HEADACHE TYPE: Primary | ICD-10-CM

## 2022-08-07 LAB
ALBUMIN SERPL-MCNC: 4.1 G/DL (ref 3.5–5)
ALBUMIN/GLOB SERPL: 1 {RATIO} (ref 1.1–2.2)
ALP SERPL-CCNC: 158 U/L (ref 45–117)
ALT SERPL-CCNC: 142 U/L (ref 12–78)
ANION GAP SERPL CALC-SCNC: 2 MMOL/L (ref 5–15)
APPEARANCE UR: CLEAR
AST SERPL-CCNC: 96 U/L (ref 15–37)
BACTERIA URNS QL MICRO: ABNORMAL /HPF
BASOPHILS # BLD: 0 K/UL (ref 0–0.1)
BASOPHILS NFR BLD: 0 % (ref 0–1)
BILIRUB SERPL-MCNC: 1 MG/DL (ref 0.2–1)
BILIRUB UR QL: NEGATIVE
BUN SERPL-MCNC: 5 MG/DL (ref 6–20)
BUN/CREAT SERPL: 6 (ref 12–20)
CALCIUM SERPL-MCNC: 11.7 MG/DL (ref 8.5–10.1)
CHLORIDE SERPL-SCNC: 114 MMOL/L (ref 97–108)
CO2 SERPL-SCNC: 26 MMOL/L (ref 21–32)
COLOR UR: ABNORMAL
CREAT SERPL-MCNC: 0.89 MG/DL (ref 0.55–1.02)
DIFFERENTIAL METHOD BLD: ABNORMAL
EOSINOPHIL # BLD: 0 K/UL (ref 0–0.4)
EOSINOPHIL NFR BLD: 0 % (ref 0–7)
EPITH CASTS URNS QL MICRO: ABNORMAL /LPF
ERYTHROCYTE [DISTWIDTH] IN BLOOD BY AUTOMATED COUNT: 14.1 % (ref 11.5–14.5)
GLOBULIN SER CALC-MCNC: 4.2 G/DL (ref 2–4)
GLUCOSE SERPL-MCNC: 125 MG/DL (ref 65–100)
GLUCOSE UR STRIP.AUTO-MCNC: NEGATIVE MG/DL
HCT VFR BLD AUTO: 51.4 % (ref 35–47)
HGB BLD-MCNC: 17.2 G/DL (ref 11.5–16)
HGB UR QL STRIP: NEGATIVE
HYALINE CASTS URNS QL MICRO: ABNORMAL /LPF (ref 0–5)
IMM GRANULOCYTES # BLD AUTO: 0 K/UL (ref 0–0.04)
IMM GRANULOCYTES NFR BLD AUTO: 0 % (ref 0–0.5)
KETONES UR QL STRIP.AUTO: 40 MG/DL
LEUKOCYTE ESTERASE UR QL STRIP.AUTO: NEGATIVE
LYMPHOCYTES # BLD: 1.7 K/UL (ref 0.8–3.5)
LYMPHOCYTES NFR BLD: 19 % (ref 12–49)
MCH RBC QN AUTO: 30.9 PG (ref 26–34)
MCHC RBC AUTO-ENTMCNC: 33.5 G/DL (ref 30–36.5)
MCV RBC AUTO: 92.3 FL (ref 80–99)
MONOCYTES # BLD: 0.5 K/UL (ref 0–1)
MONOCYTES NFR BLD: 5 % (ref 5–13)
MUCOUS THREADS URNS QL MICRO: ABNORMAL /LPF
NEUTS SEG # BLD: 6.8 K/UL (ref 1.8–8)
NEUTS SEG NFR BLD: 75 % (ref 32–75)
NITRITE UR QL STRIP.AUTO: NEGATIVE
NRBC # BLD: 0 K/UL (ref 0–0.01)
NRBC BLD-RTO: 0 PER 100 WBC
PH UR STRIP: 7 [PH] (ref 5–8)
PLATELET # BLD AUTO: 234 K/UL (ref 150–400)
PMV BLD AUTO: 10.5 FL (ref 8.9–12.9)
POTASSIUM SERPL-SCNC: 5.3 MMOL/L (ref 3.5–5.1)
PROT SERPL-MCNC: 8.3 G/DL (ref 6.4–8.2)
PROT UR STRIP-MCNC: 100 MG/DL
RBC # BLD AUTO: 5.57 M/UL (ref 3.8–5.2)
RBC #/AREA URNS HPF: ABNORMAL /HPF (ref 0–5)
SODIUM SERPL-SCNC: 142 MMOL/L (ref 136–145)
SP GR UR REFRACTOMETRY: 1.01 (ref 1–1.03)
UR CULT HOLD, URHOLD: NORMAL
UROBILINOGEN UR QL STRIP.AUTO: 0.2 EU/DL (ref 0.2–1)
WBC # BLD AUTO: 9 K/UL (ref 3.6–11)
WBC URNS QL MICRO: ABNORMAL /HPF (ref 0–4)

## 2022-08-07 PROCEDURE — 36415 COLL VENOUS BLD VENIPUNCTURE: CPT

## 2022-08-07 PROCEDURE — 96374 THER/PROPH/DIAG INJ IV PUSH: CPT

## 2022-08-07 PROCEDURE — 85025 COMPLETE CBC W/AUTO DIFF WBC: CPT

## 2022-08-07 PROCEDURE — 81001 URINALYSIS AUTO W/SCOPE: CPT

## 2022-08-07 PROCEDURE — 80053 COMPREHEN METABOLIC PANEL: CPT

## 2022-08-07 PROCEDURE — 96375 TX/PRO/DX INJ NEW DRUG ADDON: CPT

## 2022-08-07 PROCEDURE — 74011250636 HC RX REV CODE- 250/636: Performed by: PHYSICIAN ASSISTANT

## 2022-08-07 PROCEDURE — 70450 CT HEAD/BRAIN W/O DYE: CPT

## 2022-08-07 PROCEDURE — 99284 EMERGENCY DEPT VISIT MOD MDM: CPT

## 2022-08-07 RX ORDER — KETOROLAC TROMETHAMINE 30 MG/ML
30 INJECTION, SOLUTION INTRAMUSCULAR; INTRAVENOUS
Status: COMPLETED | OUTPATIENT
Start: 2022-08-07 | End: 2022-08-07

## 2022-08-07 RX ORDER — PROCHLORPERAZINE EDISYLATE 5 MG/ML
10 INJECTION INTRAMUSCULAR; INTRAVENOUS
Status: COMPLETED | OUTPATIENT
Start: 2022-08-07 | End: 2022-08-07

## 2022-08-07 RX ORDER — ONDANSETRON 4 MG/1
4 TABLET, FILM COATED ORAL
Qty: 15 TABLET | Refills: 0 | Status: SHIPPED | OUTPATIENT
Start: 2022-08-07

## 2022-08-07 RX ORDER — ONDANSETRON 2 MG/ML
4 INJECTION INTRAMUSCULAR; INTRAVENOUS
Status: COMPLETED | OUTPATIENT
Start: 2022-08-07 | End: 2022-08-07

## 2022-08-07 RX ORDER — DIPHENHYDRAMINE HYDROCHLORIDE 50 MG/ML
25 INJECTION, SOLUTION INTRAMUSCULAR; INTRAVENOUS
Status: COMPLETED | OUTPATIENT
Start: 2022-08-07 | End: 2022-08-07

## 2022-08-07 RX ADMIN — KETOROLAC TROMETHAMINE 30 MG: 30 INJECTION, SOLUTION INTRAMUSCULAR at 11:57

## 2022-08-07 RX ADMIN — PROCHLORPERAZINE EDISYLATE 10 MG: 5 INJECTION INTRAMUSCULAR; INTRAVENOUS at 11:57

## 2022-08-07 RX ADMIN — SODIUM CHLORIDE 1000 ML: 9 INJECTION, SOLUTION INTRAVENOUS at 11:57

## 2022-08-07 RX ADMIN — DIPHENHYDRAMINE HYDROCHLORIDE 25 MG: 50 INJECTION, SOLUTION INTRAMUSCULAR; INTRAVENOUS at 11:56

## 2022-08-07 RX ADMIN — ONDANSETRON 4 MG: 2 INJECTION INTRAMUSCULAR; INTRAVENOUS at 15:48

## 2022-08-07 NOTE — ED TRIAGE NOTES
Pt complains of pain in her gums and now her whole head hurts. Pt has been vomiting as well. Fever unknown per patient.

## 2022-08-07 NOTE — ED PROVIDER NOTES
Date of Service:  8/7/2022    Patient:  [facility]    Chief Complaint:  Dental Pain and Headache       HPI:  [facility] is a 47 y.o.  female who presents for evaluation of headache for the past few days. Patient states pain started in her gums and now has radiated to frontal head. Patient denies any known trauma or injury. Patient denies any dental pain today. Denies pain when eating or chewing. Patient denies dizziness, visual disturbances, or neck pain. Patient denies numbness, tingling, or weakness in bilateral upper or lower extremities. Denies chest pain, shortness of breath, or abdominal pain. Patient denies dysuria, urgency, or frequency. Patient states nausea and vomiting. The history is provided by the patient. The history is limited by a language barrier. A  was used. Dental Pain       Headache   Pertinent negatives include no fever, no shortness of breath, no weakness, no dizziness, no nausea and no vomiting. No past medical history on file. Past Surgical History:   Procedure Laterality Date    HX APPENDECTOMY           No family history on file.     Social History     Socioeconomic History    Marital status:      Spouse name: Not on file    Number of children: Not on file    Years of education: Not on file    Highest education level: Not on file   Occupational History    Not on file   Tobacco Use    Smoking status: Not on file    Smokeless tobacco: Not on file   Substance and Sexual Activity    Alcohol use: Not on file    Drug use: Not on file    Sexual activity: Not on file   Other Topics Concern    Not on file   Social History Narrative    Not on file     Social Determinants of Health     Financial Resource Strain: Not on file   Food Insecurity: Not on file   Transportation Needs: Not on file   Physical Activity: Not on file   Stress: Not on file   Social Connections: Not on file   Intimate Partner Violence: Not on file   Housing Stability: Not on file         ALLERGIES: Patient has no known allergies. Review of Systems   Constitutional:  Negative for chills and fever. HENT:  Positive for dental problem. Respiratory:  Negative for shortness of breath. Cardiovascular:  Negative for chest pain. Gastrointestinal:  Negative for abdominal pain, diarrhea, nausea and vomiting. Genitourinary:  Negative for difficulty urinating and dysuria. Musculoskeletal:  Negative for back pain and neck pain. Skin:  Negative for rash. Allergic/Immunologic: Negative for immunocompromised state. Neurological:  Positive for headaches. Negative for dizziness, syncope, speech difficulty, weakness, light-headedness and numbness. Psychiatric/Behavioral:  Negative for agitation. All other systems reviewed and are negative. Vitals:    08/07/22 1121   BP: (!) 125/59   Pulse: 98   Resp: 16   Temp: 98.9 °F (37.2 °C)   SpO2: 98%   Weight: 81.6 kg (180 lb)   Height: 5' 6\" (1.676 m)            Physical Exam  Vitals and nursing note reviewed. Constitutional:       General: She is not in acute distress. Cardiovascular:      Rate and Rhythm: Normal rate and regular rhythm. Heart sounds: No murmur heard. Pulmonary:      Effort: Pulmonary effort is normal.      Breath sounds: Normal breath sounds. Abdominal:      Palpations: Abdomen is soft. Tenderness: There is no abdominal tenderness. Musculoskeletal:         General: Normal range of motion. Skin:     General: Skin is warm. Neurological:      Mental Status: She is alert and oriented to person, place, and time. Mental status is at baseline. Cranial Nerves: Cranial nerves are intact. No cranial nerve deficit. Sensory: Sensation is intact. No sensory deficit. Motor: Motor function is intact. No weakness. Coordination: Coordination is intact. Gait: Gait is intact.         MDM     Amount and/or Complexity of Data Reviewed  Decide to obtain previous medical records or to obtain history from someone other than the patient: yes           Procedures      VITAL SIGNS:  No data found. LABS:  No results found for this or any previous visit (from the past 6 hour(s)). IMAGING:  CT HEAD WO CONT   Final Result   No acute intracranial abnormality. Medications During Visit:  Medications   ketorolac (TORADOL) injection 30 mg (30 mg IntraVENous Given 8/7/22 1157)   diphenhydrAMINE (BENADRYL) injection 25 mg (25 mg IntraVENous Given 8/7/22 1156)   prochlorperazine (COMPAZINE) injection 10 mg (10 mg IntraVENous Given 8/7/22 1157)   sodium chloride 0.9 % bolus infusion 1,000 mL (0 mL IntraVENous IV Completed 8/7/22 1300)   ondansetron (ZOFRAN) injection 4 mg (4 mg IntraVENous Given 8/7/22 1548)         DECISION MAKING:  Loli Suero is a 47 y.o. female who comes in as above. CT of the head showed no acute abnormality. Lab work-up was unremarkable. Results discussed with patient. Patient prescribed Zofran to use as needed for nausea. Patient instructed to follow-up with her primary care provider. Patient agreed to plan of care. Patient stable upon discharge. Return precautions given to patient. IMPRESSION:  1. Acute nonintractable headache, unspecified headache type        DISPOSITION:  Discharged      There are no discharge medications for this patient. Follow-up Information       Follow up With Specialties Details Why Contact Info    [facility] EMERGENCY DEPT Emergency Medicine  If symptoms worsen [phone] [address] [place]  Schedule an appointment as soon as possible for a visit   [address] [place] [address] Drive  [phone]              The patient is asked to follow-up with their primary care provider in the next several days. They are to call tomorrow for an appointment.   The patient is asked to return promptly for any increased concerns or worsening of symptoms. They can return to this emergency department or any other emergency department.